# Patient Record
Sex: MALE | Race: WHITE | NOT HISPANIC OR LATINO | Employment: FULL TIME | URBAN - METROPOLITAN AREA
[De-identification: names, ages, dates, MRNs, and addresses within clinical notes are randomized per-mention and may not be internally consistent; named-entity substitution may affect disease eponyms.]

---

## 2023-11-12 PROBLEM — R74.8 ELEVATED LIVER ENZYMES: Status: ACTIVE | Noted: 2023-11-12

## 2023-11-12 PROBLEM — I21.4 NON-ST ELEVATION MYOCARDIAL INFARCTION (NSTEMI) IN RECOVERY PHASE (MULTI): Status: ACTIVE | Noted: 2023-11-12

## 2023-11-12 PROBLEM — G47.33 OSA (OBSTRUCTIVE SLEEP APNEA): Status: ACTIVE | Noted: 2023-11-12

## 2023-11-12 PROBLEM — E66.811 CLASS 1 OBESITY WITH BODY MASS INDEX (BMI) OF 32.0 TO 32.9 IN ADULT: Status: ACTIVE | Noted: 2023-11-12

## 2023-11-12 PROBLEM — R03.0 ELEVATED BLOOD PRESSURE READING: Status: ACTIVE | Noted: 2023-11-12

## 2023-11-12 PROBLEM — L23.7 CONTACT DERMATITIS DUE TO POISON IVY: Status: ACTIVE | Noted: 2023-11-12

## 2023-11-12 PROBLEM — G47.26 SHIFT WORK SLEEP DISORDER: Status: ACTIVE | Noted: 2023-11-12

## 2023-11-12 PROBLEM — R06.02 SOB (SHORTNESS OF BREATH) ON EXERTION: Status: ACTIVE | Noted: 2023-11-12

## 2023-11-12 PROBLEM — G47.19 EXCESSIVE DAYTIME SLEEPINESS: Status: ACTIVE | Noted: 2023-11-12

## 2023-11-12 PROBLEM — K21.9 GERD (GASTROESOPHAGEAL REFLUX DISEASE): Status: ACTIVE | Noted: 2023-11-12

## 2023-11-12 PROBLEM — E66.9 CLASS 1 OBESITY WITH BODY MASS INDEX (BMI) OF 32.0 TO 32.9 IN ADULT: Status: ACTIVE | Noted: 2023-11-12

## 2023-11-12 PROBLEM — S05.02XA LEFT CORNEAL ABRASION: Status: ACTIVE | Noted: 2023-11-12

## 2023-11-12 PROBLEM — E78.00 HYPERCHOLESTEROLEMIA: Status: ACTIVE | Noted: 2023-11-12

## 2023-11-12 PROBLEM — D18.01 CAPILLARY HEMANGIOMA OF SKIN: Status: ACTIVE | Noted: 2023-11-12

## 2023-11-12 PROBLEM — R06.2 WHEEZING: Status: ACTIVE | Noted: 2023-11-12

## 2023-11-12 PROBLEM — R07.9 CHEST PAIN: Status: ACTIVE | Noted: 2023-11-12

## 2023-11-12 PROBLEM — H57.10 PAIN IN EYE: Status: ACTIVE | Noted: 2023-11-12

## 2023-11-12 RX ORDER — ALBUTEROL SULFATE 0.83 MG/ML
SOLUTION RESPIRATORY (INHALATION)
COMMUNITY
Start: 2021-09-26 | End: 2023-11-14 | Stop reason: ALTCHOICE

## 2023-11-12 RX ORDER — OMEPRAZOLE 20 MG/1
1 CAPSULE, DELAYED RELEASE ORAL DAILY
COMMUNITY
Start: 2018-11-12 | End: 2023-11-14 | Stop reason: ALTCHOICE

## 2023-11-12 RX ORDER — BUDESONIDE, GLYCOPYRROLATE, AND FORMOTEROL FUMARATE 160; 9; 4.8 UG/1; UG/1; UG/1
1 AEROSOL, METERED RESPIRATORY (INHALATION)
COMMUNITY
Start: 2021-10-08 | End: 2023-11-14 | Stop reason: ALTCHOICE

## 2023-11-12 RX ORDER — AMOXICILLIN AND CLAVULANATE POTASSIUM 875; 125 MG/1; MG/1
TABLET, FILM COATED ORAL EVERY 12 HOURS
COMMUNITY
Start: 2022-10-20 | End: 2023-11-14 | Stop reason: ALTCHOICE

## 2023-11-12 RX ORDER — ALBUTEROL SULFATE 90 UG/1
1-2 AEROSOL, METERED RESPIRATORY (INHALATION) AS NEEDED
COMMUNITY
Start: 2021-09-22 | End: 2023-11-14 | Stop reason: ALTCHOICE

## 2023-11-12 RX ORDER — BENZONATATE 200 MG/1
200 CAPSULE ORAL 3 TIMES DAILY PRN
COMMUNITY
Start: 2022-10-20 | End: 2023-11-14 | Stop reason: ALTCHOICE

## 2023-11-14 ENCOUNTER — APPOINTMENT (OUTPATIENT)
Dept: CARDIOLOGY | Facility: HOSPITAL | Age: 38
End: 2023-11-14
Payer: COMMERCIAL

## 2023-11-14 ENCOUNTER — OFFICE VISIT (OUTPATIENT)
Dept: CARDIOLOGY | Facility: HOSPITAL | Age: 38
End: 2023-11-14
Payer: COMMERCIAL

## 2023-11-14 VITALS
BODY MASS INDEX: 31.81 KG/M2 | DIASTOLIC BLOOD PRESSURE: 93 MMHG | SYSTOLIC BLOOD PRESSURE: 141 MMHG | WEIGHT: 197.09 LBS | OXYGEN SATURATION: 97 % | HEART RATE: 113 BPM

## 2023-11-14 DIAGNOSIS — R03.0 ELEVATED BLOOD PRESSURE READING: Primary | ICD-10-CM

## 2023-11-14 DIAGNOSIS — G47.33 OSA (OBSTRUCTIVE SLEEP APNEA): ICD-10-CM

## 2023-11-14 DIAGNOSIS — I20.81 ANGINA PECTORIS WITH CORONARY MICROVASCULAR DYSFUNCTION (CMS-HCC): ICD-10-CM

## 2023-11-14 PROBLEM — I10 ESSENTIAL HYPERTENSION: Status: ACTIVE | Noted: 2023-11-14

## 2023-11-14 PROCEDURE — 3080F DIAST BP >= 90 MM HG: CPT | Performed by: STUDENT IN AN ORGANIZED HEALTH CARE EDUCATION/TRAINING PROGRAM

## 2023-11-14 PROCEDURE — 93010 ELECTROCARDIOGRAM REPORT: CPT | Performed by: STUDENT IN AN ORGANIZED HEALTH CARE EDUCATION/TRAINING PROGRAM

## 2023-11-14 PROCEDURE — 99204 OFFICE O/P NEW MOD 45 MIN: CPT | Performed by: STUDENT IN AN ORGANIZED HEALTH CARE EDUCATION/TRAINING PROGRAM

## 2023-11-14 PROCEDURE — 93005 ELECTROCARDIOGRAM TRACING: CPT | Performed by: STUDENT IN AN ORGANIZED HEALTH CARE EDUCATION/TRAINING PROGRAM

## 2023-11-14 PROCEDURE — 99214 OFFICE O/P EST MOD 30 MIN: CPT | Performed by: STUDENT IN AN ORGANIZED HEALTH CARE EDUCATION/TRAINING PROGRAM

## 2023-11-14 PROCEDURE — 3077F SYST BP >= 140 MM HG: CPT | Performed by: STUDENT IN AN ORGANIZED HEALTH CARE EDUCATION/TRAINING PROGRAM

## 2023-11-14 RX ORDER — AMLODIPINE BESYLATE 5 MG/1
5 TABLET ORAL DAILY
Qty: 30 TABLET | Refills: 11 | Status: SHIPPED | OUTPATIENT
Start: 2023-11-14 | End: 2024-11-13

## 2023-11-14 RX ORDER — NITROGLYCERIN 0.4 MG/1
0.4 TABLET SUBLINGUAL EVERY 5 MIN PRN
Qty: 100 TABLET | Refills: 11 | Status: SHIPPED | OUTPATIENT
Start: 2023-11-14 | End: 2023-12-07 | Stop reason: WASHOUT

## 2023-11-14 NOTE — PATIENT INSTRUCTIONS
It was nice meeting you today. Will restart amlodipine. Will get basic blood work.   Call after 2 weeks with blood pressure numbers, if blood pressure still >130/80 will add hydrochlorothiazide   Will get an echo and stress test   Quit chewing tobacco     We discussed the importance of complete smoking cessations and pharmacological options to do so.       We discussed the value of home BP monitoring. Patient agrees to bring a log of readings to follow up appointments.    We discussed the value of a Mediterranean / DASH style diet with reduction of processed carbohydrates and added salt.    Home BP monitoring instructions:::: Goal < 130 / 80   Remain still, Avoid smoking, caffeinated beverages, or exercise within 30 min before BP measurements.  Ensure 5 min of quiet rest before BP measurements.  Sit correctly with back straight and supported (on a straight-backed dining chair, for example, rather than a sofa).  Sit with feet flat on the floor and legs uncrossed.  Keep arm supported on a flat surface (such as a table), with the upper arm at heart level.  Bottom of the cuff should be placed directly above the bend of the elbow  Take a reading in the AM before breakfast 2-3 times / week   Record all readings accurately:  A written log should be brought to all appointments   Monitors with built-in memory should be brought to all clinic appointments and calibrated to our machines

## 2023-11-14 NOTE — PROGRESS NOTES
Primary Care Physician: Isiah Peters MD   Date of Visit: 11/14/2023  2:20 PM EST  Type of Visit: new      Chief Complaint:  No chief complaint on file.       HPI  Be Ortega 38 y.o. male male with history of hypertension, NELY with CPAP and GERD, had  a LHC in 6/2020 at [facility] for concerns of ACS: No significant obstructive coronary disease, note was made of slow flow in the LAD. He followed with Dr. Cabrera and was started on amlodipine with significant improvement. Coronary calcium score 0, echocardiogram was unremarkable.   He is here today for high BP    He has NELY but not on CPAP as its not working  BP 3 times a day in 140s range  He has been off amlodipine for 2 years    No le edema  No sob with exertion  Not seen by PCP this year  Had an episode of chest pain last week. Lt sided tightness and referred to lt arm he did not measure his BP. Lasted for 20-30 mins    Sometimes he gets flushing sensation   No palpitations or dizziness or syncope.    No alcohol  Quit smoking years ago  Chewing tobacco   sometimes he has troubles with sleeping      Review of Systems   Review of Systems   12 points review of systems are negative expect for the above    Social History:  Social History     Socioeconomic History    Marital status:      Spouse name: Not on file    Number of children: Not on file    Years of education: Not on file    Highest education level: Not on file   Occupational History    Not on file   Tobacco Use    Smoking status: Not on file    Smokeless tobacco: Not on file   Substance and Sexual Activity    Alcohol use: Not on file    Drug use: Not on file    Sexual activity: Not on file   Other Topics Concern    Not on file   Social History Narrative    Not on file     Social Determinants of Health     Financial Resource Strain: Not on file   Food Insecurity: Not on file   Transportation Needs: Not on file   Physical Activity: Not on file   Stress: Not on file   Social  "Connections: Not on file   Intimate Partner Violence: Not on file   Housing Stability: Not on file        Past Medical History:  Past Medical History:   Diagnosis Date    Cutaneous abscess of buttock 01/27/2016    Abscess of buttock    Other chest pain 06/09/2020    Atypical chest pain    Other muscle spasm 09/16/2015    Trapezius muscle spasm    Other specified diseases of anus and rectum 01/26/2016    Rectal pain       Past Surgical History:  No past surgical history on file.    Family History:  Family History   Problem Relation Name Age of Onset    Hypertension Father      Sleep apnea Father      Coronary artery disease Brother      Breast cancer Maternal Grandmother      Other (mesothelioma) Maternal Grandfather      Pancreatic cancer Paternal Grandfather          Objective:       6/24/2020     9:49 AM 6/24/2020    10:22 AM 7/28/2020    10:52 AM 6/15/2021     9:25 AM 9/22/2021     2:48 PM 10/8/2021    11:33 AM 10/20/2022     1:07 PM   Vitals   Systolic 141 122 120 115  133 125   Diastolic 97 86 76 79  87 80   Heart Rate 87  79 78  74 105   Temp    36.3 °C (97.3 °F) 36.4 °C (97.5 °F) 36.6 °C (97.8 °F) 37.7 °C (99.8 °F)   Resp    16  15 16   Height (in) 1.676 m (5' 6\")  1.676 m (5' 6\") 1.676 m (5' 6\") 1.676 m (5' 6\")  1.676 m (5' 6\")   Weight (lb) 190.48  184 191.2 190 201.19 188   BMI 30.74 kg/m2  29.7 kg/m2 30.86 kg/m2 30.67 kg/m2 32.47 kg/m2 30.34 kg/m2   BSA (m2) 2.01 m2  1.97 m2 2.01 m2 2 m2 2.06 m2 1.99 m2      Constitutional:       Appearance: Healthy appearance. Not in distress.   Neck:      Vascular: No JVR. JVD normal.   Pulmonary:      Effort: Pulmonary effort is normal.      Breath sounds: Normal breath sounds. No wheezing. No rhonchi. No rales.   Chest:      Chest wall: Not tender to palpatation.   Cardiovascular:      PMI at left midclavicular line. Normal rate. Regular rhythm. Normal S1. Normal S2.       Murmurs: There is no murmur.      No gallop.  No click. No rub.   Pulses:     Intact distal " pulses.   Edema:     Peripheral edema absent.   Abdominal:      General: Bowel sounds are normal.      Palpations: Abdomen is soft.      Tenderness: There is no abdominal tenderness.   Musculoskeletal: Normal range of motion.         General: No tenderness.   Skin:     General: Skin is warm and dry.   Neurological:      General: No focal deficit present.      Mental Status: Alert and oriented to person, place and time.     Allergies:  No Known Allergies    Medications:  Current Outpatient Medications   Medication Instructions    albuterol 2.5 mg /3 mL (0.083 %) nebulizer solution USE 1 UNIT DOSE EVERY 4-6 HOURS AS NEEDED FOR WHEEZING .    albuterol 90 mcg/actuation inhaler 1-2 puffs, inhalation, As needed, EVERY 4 TO 6 HOURS    amoxicillin-pot clavulanate (Augmentin) 875-125 mg tablet oral, Every 12 hours, Until gone    benzonatate (TESSALON) 200 mg, oral, 3 times daily PRN    budesonide-glycopyr-formoterol (Breztri Aerosphere) 160-9-4.8 mcg/actuation HFA aerosol inhaler 1 puff, inhalation, 2 times daily RT, then taper as tolerated    IBUPROFEN ORAL Ibuprofen    naproxen sodium (ALEVE ORAL) Aleve    omeprazole (PriLOSEC) 20 mg DR capsule 1 capsule, oral, Daily    tramadol HCl (TRAMADOL ORAL) traMADol HCl        Labs and Imaging:     Lab Results   Component Value Date    WBC 5.0 06/15/2021    HGB 15.1 06/15/2021    HCT 44.8 06/15/2021     06/15/2021    CHOL 159 06/15/2021    TRIG 82 06/15/2021    HDL 45.8 06/15/2021    ALT 80 (H) 06/15/2021    AST 40 (H) 06/15/2021     06/15/2021    K 3.9 06/15/2021     06/15/2021    CREATININE 0.83 06/15/2021    BUN 16 06/15/2021    CO2 24 06/15/2021    TSH 0.67 11/12/2018    INR 1.1 06/22/2020         Echocardiogram:   Echocardiogram     [facility], [address] [place] [address], [place] [id number]  Tel [phone] and Fax [phone]    TRANSTHORACIC ECHOCARDIOGRAM REPORT      Patient Name:     MILAGROS Santiago Physician:   [id number] Foreign  "Rick SWIFT MD  Study Date:       7/28/2020           Referring Physician: Foreign Cabrera MD  MRN/PID:          [id number]            PCP:  Accession/Order#: [id number]        Department Location: [facility]  YOB: 1985           Fellow:  Gender:           M                   Nurse:  Admit Date:                           Sonographer:         Gloria Guerrero \"RDCS,  RCS\"  Admission Status: Outpatient          Additional Staff:  Height:           167.64 cm           CC Report to:  Weight:           86.18 kg            Study Type:          Echocardiogram  BSA:              1.96 m2  Blood Pressure: 122 /86 mmHg    Diagnosis/ICD: R07.89-Other chest pain  Indication:    chest pain  Procedure/CPT: Echo Complete w Full [id number]    Patient History:  Pertinent History: Chest Pain, HTN and Family HX of CAD.    Study Detail: The following Echo studies were performed: 2D, M-Mode, Doppler and  color flow.      PHYSICIAN INTERPRETATION:  Left Ventricle: The left ventricular systolic function is normal, with an estimated ejection fraction of 55-60%. There are no regional wall motion abnormalities. The left ventricular cavity size is normal. Spectral Doppler shows a normal pattern of left ventricular diastolic filling.  Left Atrium: The left atrium is normal in size.  Right Ventricle: The right ventricle is normal in size. There is normal right ventricular global systolic function.  Right Atrium: The right atrium is normal in size.  Aortic Valve: The aortic valve is trileaflet. There is no evidence of aortic valve stenosis.  There is no evidence of aortic valve regurgitation. The peak instantaneous gradient of the aortic valve is 4.5 mmHg. The mean gradient of the aortic valve is 3.0 mmHg.  Mitral Valve: The mitral valve is normal in structure. There is trace mitral valve regurgitation.  Tricuspid Valve: The tricuspid valve is structurally normal. There is trace " tricuspid regurgitation.  Pulmonic Valve: The pulmonic valve is structurally normal. There is trace pulmonic valve regurgitation.  Pericardium: There is no pericardial effusion noted.  Aorta: The aortic root is normal.  Pulmonary Artery: The tricuspid regurgitant velocity is 2.21 m/s, and with an estimated right atrial pressure of 10 mmHg, the estimated pulmonary artery pressure is normal with the RVSP at 29.5 mmHg.  Systemic Veins: The inferior vena cava was not well visualized.  In comparison to the previous echocardiogram(s): There are no prior studies on this patient for comparison purposes.      CONCLUSIONS:  1. The left ventricular systolic function is normal with a 55-60% estimated ejection fraction.  2. There is trace mitral, tricuspid and pulmonic regurgitation.    QUANTITATIVE DATA SUMMARY:  2D MEASUREMENTS:  Normal Ranges:  Ao Root d:     3.00 cm   (2.0-3.7cm)  LAs:           3.50 cm   (2.7-4.0cm)  IVSd:          0.90 cm   (0.6-1.1cm)  LVPWd:         0.90 cm   (0.6-1.1cm)  LVIDd:         5.40 cm   (3.9-5.9cm)  LVIDs:         3.60 cm  LV Mass Index: 92.0 g/m2  LV % FS        33.3 %    LA VOLUME:  Normal Ranges:  LA Area A4C:     15.5 cm2  LA Area A2C:     12.8 cm2  LA Volume Index: 18.0 ml/m2  LA Vol A4C:      42.0 ml  LA Vol A2C:      31.0 ml  LA Vol BP:       37.0 ml    LV SYSTOLIC FUNCTION BY 2D PLANIMETRY (MOD):  Normal Ranges:  EF-A4C View: 56.5 % (>55%)  EF-A2C View: 74.5 %  EF-Biplane:  64.6 %    LV DIASTOLIC FUNCTION:  Normal Ranges:  MV Peak E:        0.59 m/s    (0.7-1.2 m/s)  MV Peak A:        0.57 m/s    (0.42-0.7 m/s)  E/A Ratio:        1.03        (1.0-2.2)  MV lateral e'     0.08 m/s  MV medial e'      0.08 m/s  MV A Dur:         114.00 msec  E/e' Ratio:       7.40        (<8.0)  PulmV Sys Dre:    49.40 cm/s  PulmV Ugarte Dre:   35.50 cm/s  PulmV S/D Dre:    1.40  PulmV A Revs Dre: 73.10 cm/s  PulmV A Revs Dur: 98.00 msec    MITRAL VALVE:  Normal Ranges:  MV DT: 153 msec  (150-240msec)    AORTIC VALVE:  Normal Ranges:  AoV Vmax:      1.06 m/s (<1.7m/s)  AoV Peak P.5 mmHg (<20mmHg)  AoV Mean PG:   3.0 mmHg (1.7-11.5mmHg)  AoV VTI:       22.70 cm (18-25cm)  LVOT Diameter: 2.00 cm  (1.8-2.4cm)    RIGHT VENTRICLE:  RV 1   3.8 cm  RV 2   3.5 cm  RV 3   4.9 cm  TAPSE: 20.0 mm    TRICUSPID VALVE/RVSP:  Normal Ranges:  Peak TR Velocity: 2.21 m/s  RV Syst Pressure: 29.5 mmHg (< 30mmHg)    PULMONIC VALVE:  Normal Ranges:  PV Max Dre: 0.9 m/s  (0.6-0.9m/s)  PV Max PG:  3.1 mmHg    Pulmonary Veins:  PulmV A Revs Dur: 98.00 msec  PulmV A Revs Dre: 73.10 cm/s  PulmV Ugarte Dre:   35.50 cm/s  PulmV S/D Dre:    1.40  PulmV Sys Dre:    49.40 cm/s      [id number] Foreign Cabrera MD  Electronically signed on 2020 at 9:13:18 AM         Final     Stress Testing: No results found for this or any previous visit from the past 1825 days.    Cardiac Catheterization:   ADULT CATH     Narrative  Ordered by an unspecified provider.    Cardiac Scoring:   CT HEART CALCIUM SCORING WO IV CONTRAST 2020    Narrative  MRN: [id number]  Patient Name: MILAGROS SWIFT    STUDY:  CT CARDIAC SCORING;  2020 2:08 pm    INDICATION:  hypercholesterolemia..    COMPARISON:  None.    ACCESSION NUMBER(S):  [id number]    ORDERING CLINICIAN:  NICHOLAS WILSON    TECHNIQUE:  Using prospective ECG gating, CT scan of the coronary arteries was  performed without intravenous contrast. Coronary calcium scoring  was  performed according to the method of Agatston.    FINDINGS:  The score and distribution of calcium in the coronary arteries is as  follows:    LM: 0.  LAD: 0.  LCx: 0.  RCA: 0.    Total: 0.    The visualized segments of the lungs demonstrate mild atelectasis.    The visualized mid/lower ascending thoracic aorta measures 2.6 cm in  diameter.    The heart is normal in size. No pericardial effusion is present.    No gross evidence of mediastinal or hilar lymphadenopathy is  identified.    There is hepatic  "steatosis.    Impression  1. Coronary artery calcium score of 0*.    *Coronary artery calcium scoring may be helpful in predicting the  risk for future coronary heart disease events.  According to the  American College of Cardiology Foundation Clinical Expert Consensus  Task Force, such testing provides important prognostic information in  patients with more than one coronary heart disease risk factor. The  coronary artery calcium score correlates with the annual risk of a  non-fatal myocardial infarction or coronary heart disease death.    Coronary artery score            Annual Risk    0-99                             0.4%  100-399                        1.3%  >400                            2.4%    These three \"breakpoints\" correspond to lower, intermediate and high  risk states for future coronary events.  Such information should be  used, along with appropriate clinical judgment, to make decisions  regarding the intensity of risk factor management strategies to treat  blood lipids and to modify other non-lipid coronary risk factors.    Reference: [place] P et al. Circulation.  2007; 115:402-426    AAA : No results found for this or any previous visit from the past 1825 days.    OTHER: No results found for this or any previous visit from the past 1825 days.      The ASCVD Risk score (Grazyna DK, et al., 2019) failed to calculate for the following reasons:    The 2019 ASCVD risk score is only valid for ages 40 to 79    The patient has a prior MI or stroke diagnosis     Assessment/Plan:   No diagnosis found.   Luisjasonleigh Ortega 38 y.o. male male with history of hypertension, NELY with CPAP and GERD, had  a LHC in 6/2020 at [facility] for concerns of ACS: No significant obstructive coronary disease, note was made of slow flow in the LAD. He followed with Dr. Cabrera and was started on amlodipine with significant improvement. Coronary calcium score 0, echocardiogram was unremarkable.     EKG today with new TWI " in ant-lateral leads compared to 2020  He had typical anginal pain last week.     Plan  Restart amlodipine  Will get an echo   Will get a treadmill nuc   Will give SLN script   Consider adding hydrochlorothiazide 25 mg qd if BP still not controlled   Quit chewing tobacco   Will get full lab panel     Time Spent: I spent  minutes reviewing medical testing, obtaining medical history and counselling and educating on diagnosis and documenting clinical encounter.         ____________________________________________________________  Max Reed MD   of Medicine  Division of Cardiovascular Medicine   [facility]  [facility]

## 2023-11-15 LAB
ATRIAL RATE: 107 BPM
P AXIS: 25 DEGREES
P OFFSET: 203 MS
P ONSET: 148 MS
PR INTERVAL: 140 MS
Q ONSET: 218 MS
QRS COUNT: 18 BEATS
QRS DURATION: 84 MS
QT INTERVAL: 306 MS
QTC CALCULATION(BAZETT): 408 MS
QTC FREDERICIA: 371 MS
R AXIS: 86 DEGREES
T AXIS: -39 DEGREES
T OFFSET: 371 MS
VENTRICULAR RATE: 107 BPM

## 2023-11-23 ENCOUNTER — APPOINTMENT (OUTPATIENT)
Dept: RADIOLOGY | Facility: HOSPITAL | Age: 38
End: 2023-11-23
Payer: COMMERCIAL

## 2023-11-23 ENCOUNTER — HOSPITAL ENCOUNTER (EMERGENCY)
Facility: HOSPITAL | Age: 38
Discharge: HOME | End: 2023-11-23
Attending: STUDENT IN AN ORGANIZED HEALTH CARE EDUCATION/TRAINING PROGRAM
Payer: COMMERCIAL

## 2023-11-23 VITALS
HEIGHT: 66 IN | RESPIRATION RATE: 18 BRPM | SYSTOLIC BLOOD PRESSURE: 127 MMHG | OXYGEN SATURATION: 96 % | HEART RATE: 74 BPM | WEIGHT: 199.3 LBS | DIASTOLIC BLOOD PRESSURE: 89 MMHG | BODY MASS INDEX: 32.03 KG/M2 | TEMPERATURE: 98.1 F

## 2023-11-23 DIAGNOSIS — R07.9 CHEST PAIN, UNSPECIFIED TYPE: Primary | ICD-10-CM

## 2023-11-23 LAB
ALBUMIN SERPL-MCNC: 4.7 G/DL (ref 3.5–5)
ALP BLD-CCNC: 60 U/L (ref 35–125)
ALT SERPL-CCNC: 62 U/L (ref 5–40)
ANION GAP SERPL CALC-SCNC: 10 MMOL/L
AST SERPL-CCNC: 32 U/L (ref 5–40)
BILIRUB SERPL-MCNC: 0.4 MG/DL (ref 0.1–1.2)
BUN SERPL-MCNC: 20 MG/DL (ref 8–25)
CALCIUM SERPL-MCNC: 9.6 MG/DL (ref 8.5–10.4)
CHLORIDE SERPL-SCNC: 102 MMOL/L (ref 97–107)
CO2 SERPL-SCNC: 25 MMOL/L (ref 24–31)
CREAT SERPL-MCNC: 1.3 MG/DL (ref 0.4–1.6)
ERYTHROCYTE [DISTWIDTH] IN BLOOD BY AUTOMATED COUNT: 12.5 % (ref 11.5–14.5)
GFR SERPL CREATININE-BSD FRML MDRD: 72 ML/MIN/1.73M*2
GLUCOSE SERPL-MCNC: 98 MG/DL (ref 65–99)
HCT VFR BLD AUTO: 43.1 % (ref 41–52)
HGB BLD-MCNC: 15.1 G/DL (ref 13.5–17.5)
MCH RBC QN AUTO: 28.8 PG (ref 26–34)
MCHC RBC AUTO-ENTMCNC: 35 G/DL (ref 32–36)
MCV RBC AUTO: 82 FL (ref 80–100)
NRBC BLD-RTO: 0 /100 WBCS (ref 0–0)
PLATELET # BLD AUTO: 228 X10*3/UL (ref 150–450)
POTASSIUM SERPL-SCNC: 3.9 MMOL/L (ref 3.4–5.1)
PROT SERPL-MCNC: 7.1 G/DL (ref 5.9–7.9)
RBC # BLD AUTO: 5.25 X10*6/UL (ref 4.5–5.9)
SODIUM SERPL-SCNC: 137 MMOL/L (ref 133–145)
TROPONIN T SERPL-MCNC: <6 NG/L
TROPONIN T SERPL-MCNC: <6 NG/L
WBC # BLD AUTO: 5.9 X10*3/UL (ref 4.4–11.3)

## 2023-11-23 PROCEDURE — 96374 THER/PROPH/DIAG INJ IV PUSH: CPT

## 2023-11-23 PROCEDURE — 85027 COMPLETE CBC AUTOMATED: CPT | Performed by: NURSE PRACTITIONER

## 2023-11-23 PROCEDURE — 36415 COLL VENOUS BLD VENIPUNCTURE: CPT | Performed by: NURSE PRACTITIONER

## 2023-11-23 PROCEDURE — 84484 ASSAY OF TROPONIN QUANT: CPT | Performed by: NURSE PRACTITIONER

## 2023-11-23 PROCEDURE — 80053 COMPREHEN METABOLIC PANEL: CPT | Performed by: NURSE PRACTITIONER

## 2023-11-23 PROCEDURE — 2500000004 HC RX 250 GENERAL PHARMACY W/ HCPCS (ALT 636 FOR OP/ED): Performed by: NURSE PRACTITIONER

## 2023-11-23 PROCEDURE — 71045 X-RAY EXAM CHEST 1 VIEW: CPT | Mod: FY

## 2023-11-23 PROCEDURE — 99284 EMERGENCY DEPT VISIT MOD MDM: CPT | Mod: 25

## 2023-11-23 PROCEDURE — 99285 EMERGENCY DEPT VISIT HI MDM: CPT | Mod: 25 | Performed by: STUDENT IN AN ORGANIZED HEALTH CARE EDUCATION/TRAINING PROGRAM

## 2023-11-23 RX ORDER — KETOROLAC TROMETHAMINE 30 MG/ML
30 INJECTION, SOLUTION INTRAMUSCULAR; INTRAVENOUS ONCE
Status: COMPLETED | OUTPATIENT
Start: 2023-11-23 | End: 2023-11-23

## 2023-11-23 RX ADMIN — KETOROLAC TROMETHAMINE 30 MG: 30 INJECTION, SOLUTION INTRAMUSCULAR at 19:59

## 2023-11-23 ASSESSMENT — HEART SCORE
RISK FACTORS: 1-2 RISK FACTORS
TROPONIN: LESS THAN OR EQUAL TO NORMAL LIMIT
AGE: <45
HEART SCORE: 3
HISTORY: MODERATELY SUSPICIOUS
ECG: NON-SPECIFIC REPOLARIZATION DISTURBANCE

## 2023-11-23 ASSESSMENT — PAIN DESCRIPTION - DESCRIPTORS
DESCRIPTORS: TIGHTNESS
DESCRIPTORS: TIGHTNESS;PRESSURE

## 2023-11-23 ASSESSMENT — PAIN SCALES - GENERAL
PAINLEVEL_OUTOF10: 0 - NO PAIN
PAINLEVEL_OUTOF10: 4
PAINLEVEL_OUTOF10: 3

## 2023-11-23 ASSESSMENT — PAIN - FUNCTIONAL ASSESSMENT
PAIN_FUNCTIONAL_ASSESSMENT: 0-10
PAIN_FUNCTIONAL_ASSESSMENT: 0-10

## 2023-11-23 ASSESSMENT — COLUMBIA-SUICIDE SEVERITY RATING SCALE - C-SSRS
2. HAVE YOU ACTUALLY HAD ANY THOUGHTS OF KILLING YOURSELF?: NO
1. IN THE PAST MONTH, HAVE YOU WISHED YOU WERE DEAD OR WISHED YOU COULD GO TO SLEEP AND NOT WAKE UP?: NO
6. HAVE YOU EVER DONE ANYTHING, STARTED TO DO ANYTHING, OR PREPARED TO DO ANYTHING TO END YOUR LIFE?: NO

## 2023-11-23 ASSESSMENT — PAIN DESCRIPTION - DIRECTION: RADIATING_TOWARDS: LEFT ARM

## 2023-11-23 ASSESSMENT — PAIN DESCRIPTION - LOCATION: LOCATION: CHEST

## 2023-11-24 ENCOUNTER — HOSPITAL ENCOUNTER (OUTPATIENT)
Dept: CARDIOLOGY | Facility: HOSPITAL | Age: 38
Discharge: HOME | End: 2023-11-24
Payer: COMMERCIAL

## 2023-11-24 LAB
ATRIAL RATE: 84 BPM
P AXIS: 32 DEGREES
P OFFSET: 198 MS
P ONSET: 142 MS
PR INTERVAL: 156 MS
Q ONSET: 220 MS
QRS COUNT: 14 BEATS
QRS DURATION: 84 MS
QT INTERVAL: 348 MS
QTC CALCULATION(BAZETT): 411 MS
QTC FREDERICIA: 389 MS
R AXIS: 37 DEGREES
T AXIS: 21 DEGREES
T OFFSET: 394 MS
VENTRICULAR RATE: 84 BPM

## 2023-11-24 PROCEDURE — 93005 ELECTROCARDIOGRAM TRACING: CPT

## 2023-11-24 NOTE — ED PROVIDER NOTES
HPI   Chief Complaint   Patient presents with    Chest Pain     Patient reports chest tightness for about an hour and a half that is radiating to the left arm. Patient has cardiac hx, took 3 nitroglycerin pta with no relief. Denies nausea and sob.       HPI  See my MDM                  No data recorded                Patient History   Past Medical History:   Diagnosis Date    Cutaneous abscess of buttock 01/27/2016    Abscess of buttock    Other chest pain 06/09/2020    Atypical chest pain    Other muscle spasm 09/16/2015    Trapezius muscle spasm    Other specified diseases of anus and rectum 01/26/2016    Rectal pain     No past surgical history on file.  Family History   Problem Relation Name Age of Onset    Hypertension Father      Sleep apnea Father      Coronary artery disease Brother      Breast cancer Maternal Grandmother      Other (mesothelioma) Maternal Grandfather      Pancreatic cancer Paternal Grandfather       Social History     Tobacco Use    Smoking status: Not on file    Smokeless tobacco: Not on file   Substance Use Topics    Alcohol use: Not on file    Drug use: Not on file       Physical Exam   ED Triage Vitals [11/23/23 1857]   Temp Heart Rate Resp BP   36.7 °C (98 °F) 88 18 137/74      SpO2 Temp Source Heart Rate Source Patient Position   98 % Oral Monitor Sitting      BP Location FiO2 (%)     Right arm --       Physical Exam  CONSTITUTIONAL: Vital signs reviewed as charted, well-developed and in no distress  Eyes: Extraocular muscles are intact. Pupils equal round and reactive to light. Conjunctiva are pink.    ENT: Mucous membranes are moist. Tongue in the midline. Pharynx was without erythema or exudates, uvula midline  LUNGS: Breath sounds equal and clear to auscultation. Good air exchange, no wheezes rales or retractions, pulse oximetry is charted.  HEART: Regular rate and rhythm without murmur thrill or rub, strong tones, auscultation is normal.  ABDOMEN: Soft and nontender without  guarding rebound rigidity or mass. Bowel sounds are present and normal in all quadrants. There is no palpable masses or aneurysms identified. No hepatosplenomegaly, normal abdominal exam.  Neuro: The patient is awake, alert and oriented ×3. Moving all 4 extremities and answering questions appropriately.   MUSCULOSKELETAL: The calves are nontender to palpation. Full gross active range of motion.   PSYCH: Awake alert oriented, normal mood and affect.  Skin:  Dry, normal color, warm to the touch, no rash present.      ED Course & MDM   ED Course as of 11/24/23 1001   Thu Nov 23, 2023 1921 EKG Time: 1911  EKG Interpretation time: 1920  EKG Interpretation: Normal sinus rhythm, nonspecific T wave changes, QTc 411, no evidence of STEMI    EKG was interpreted by myself independently [PARMINDER]   2000 Care transferred to dr. Short [SERAFIN]      ED Course User Index  [JL] Josafat Short DO  [RJ] Juan Bella, APRN-CNP         Diagnoses as of 11/24/23 1001   Chest pain, unspecified type       Medical Decision Making  History obtained from: patient    Vital signs, nursing notes, current medications, past medical history, Surgical history, allergies, social history, family History were reviewed.         HPI:  Patient is a 38-year-old male presenting emergency room today complaining of chest tightness ongoing for about 30 minutes radiates to his left arm.  Took 3 nitro prior to arrival without relief of symptoms.  Had a cardiac work-up done about 3 years ago including normal catheterization, normal transesophageal echo.  Recently was evaluated by his cardiologist has been having some intermittent chest pain about 10 days ago had an EKG with some T wave inversions.  Was put back on his blood pressure medicine and given sublingual nitro.  He also had a calcium scoring test done about 3 years ago with a score of 0.  He denies any fever chills or night sweats.  States he has had some sinus congestion.  Denies nausea or vomiting.  No  alleviating or chest pain factors.  No recent travel or surgeries.  He does work as a .  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  Former smoker, current chewing tobacco user      10 point ROS was reviewed and negative except Noted above in HPI.  DDX: as listed above    Chest x-ray interpreted by the radiologist showed: No acute cardiopulmonary process  Medications administered during this visit (name and route): IV ketorolac  EKG interpretted by my attending physician    ESTELLA Summary/considerations:  I estimate there is a low risk for pericardial tamponade, pneumothorax, pulmonary embolism, acute coronary syndrome, or thoracic aortic dissection, thus I considered the discharge disposition reasonable. We have discussed the diagnosis and risks, and we agree with discharging home to follow up with there cardiologist. We also discussed returning to the emergency department immediately if new or worsening symptoms occur. We have discussed the symptoms which are most concerning such as bloody sputum, fever, worsening pain or shortness of breath, or vomiting necessitates immediate return.    Discussed heart score with patient, Heart score 3 or less and risk of MACE of 0.9-1.7%. in the next six weeks, patient understands although low risk they're still at risk and will discuss this with their PCP and obtain further outpatient cardiac testing in the next 7 days. offered observation patient or any family members felt uncomfortable do not feel necessary at this time.Offered observation for further monitoring evaluation and treatment if patient or any others present felt uncomfortable with plan, do not feel necessary at this time.      Patient with 2 normal troponins, normal chest x-ray, normal electrolytes, normal blood counts.  Was feeling better after IV ketorolac here in the ED.  Heart score of 3.  Will be discharged home to follow-up with cardiologist 1 to 2 days for reevaluation.  Was discharged home in stable condition.  Does  have orders for echocardiogram and stress test currently.        I saw this patient in conjunction with Dr. Short, please see his supervision note.    All of the patient's questions were answered to the best of my ability.  Patient states understanding that they have been screened for an emergency today and we have not found any etiology of symptoms that requires emergent treatment or admission to the hospital at this point. They understand that they have not had definitive care day and require follow-up for treatment of their condition. They also state understanding that they may have an emergent condition that may potentially have not of detected at this visit and they must return to the emergency department if they develop any worsening of symptoms or new complaints.      Critical Care: Not warranted at this time    Prescriptions provided include: none    This chart was completed using voice recognition transcription software. Please excuse any errors of transcription including grammatical, punctuation, syntax and spelling errors.  Please contact me with any questions regarding this chart.    Procedure  Procedures     CHARLES Salguero-CNP  11/24/23 1002

## 2023-11-24 NOTE — ED PROVIDER NOTES
Patient was seen by both myself and advanced practitioner.  I performed substantive portion of the visit including all aspects of the medical decision making.  Please refer to advanced practitioner's note further workup, evaluation.    Patient is a 38-year-old male who presents emergency department for evaluation of chest pain.  Patient states that he started having a tightness in the left side of the chest that radiates to his left arm while he was driving prior to arrival.  He tried taking 3 of his nitroglycerin prior to arrival with no improvement of pain.  He states it has significantly subsided at this time.  He states he had similar episodes several years ago and was diagnosed with angina and given prescription for nitro.  He did have a heart cath at that time however it was clear showing no evidence of blockage.  Patient states he was scheduled for stress test and echo as an outpatient by his cardiologist.    On exam patient resting comfortably in no obvious distress.  Vital signs are stable arrival.  He is awake, alert and oriented.  Lungs are clear to auscultation bilaterally and respirations are easy, nonlabored.  Regular rate and rhythm on auscultation of the heart and 2+ radial pulses bilaterally.  He has no numbness or tingling the extremities, weakness of the extremities.  EKG shows normal sinus rhythm with nonspecific ST changes but no evidence of STEMI.  Initial blood work was unremarkable including negative troponin of less than 6, normal white count, normal electrolytes.  Chest x-ray is clear showing no obvious evidence of pneumonia, pneumothorax, wide mediastinum.  He was given p.o. Toradol for pain.  Repeat troponin remained flat at 6 and patient has a heart score of 3.  I have low suspicion for major cardiac issue and patient is felt to be stable for outpatient follow-up at this time.  Return precautions discussed with patient and his significant other.     Josafat Short, DO  11/23/23 [id number]

## 2023-11-24 NOTE — DISCHARGE INSTRUCTIONS
Follow-up with your primary care physician as an outpatient as well as your cardiologist by calling the office tomorrow to schedule close follow-up appointment.  If symptoms worsen or change he can return at any time for further evaluation and treatment.

## 2023-12-01 ENCOUNTER — HOSPITAL ENCOUNTER (OUTPATIENT)
Dept: CARDIOLOGY | Facility: HOSPITAL | Age: 38
Discharge: HOME | End: 2023-12-01
Payer: COMMERCIAL

## 2023-12-01 ENCOUNTER — HOSPITAL ENCOUNTER (OUTPATIENT)
Dept: RADIOLOGY | Facility: HOSPITAL | Age: 38
Discharge: HOME | End: 2023-12-01
Payer: COMMERCIAL

## 2023-12-01 ENCOUNTER — LAB (OUTPATIENT)
Dept: LAB | Facility: LAB | Age: 38
End: 2023-12-01
Payer: COMMERCIAL

## 2023-12-01 DIAGNOSIS — R03.0 ELEVATED BLOOD PRESSURE READING: ICD-10-CM

## 2023-12-01 DIAGNOSIS — I20.89 OTHER FORMS OF ANGINA PECTORIS (CMS-HCC): ICD-10-CM

## 2023-12-01 DIAGNOSIS — R07.9 CHEST PAIN, UNSPECIFIED: ICD-10-CM

## 2023-12-01 DIAGNOSIS — I20.81 ANGINA PECTORIS WITH CORONARY MICROVASCULAR DYSFUNCTION (CMS-HCC): ICD-10-CM

## 2023-12-01 DIAGNOSIS — G47.33 OSA (OBSTRUCTIVE SLEEP APNEA): ICD-10-CM

## 2023-12-01 LAB
ALBUMIN SERPL BCP-MCNC: 5 G/DL (ref 3.4–5)
ALP SERPL-CCNC: 58 U/L (ref 33–120)
ALT SERPL W P-5'-P-CCNC: 49 U/L (ref 10–52)
ANION GAP SERPL CALC-SCNC: 14 MMOL/L (ref 10–20)
AST SERPL W P-5'-P-CCNC: 22 U/L (ref 9–39)
BILIRUB SERPL-MCNC: 0.6 MG/DL (ref 0–1.2)
BUN SERPL-MCNC: 18 MG/DL (ref 6–23)
CALCIUM SERPL-MCNC: 9.4 MG/DL (ref 8.6–10.3)
CHLORIDE SERPL-SCNC: 104 MMOL/L (ref 98–107)
CHOLEST SERPL-MCNC: 140 MG/DL (ref 0–199)
CHOLESTEROL/HDL RATIO: 3.1
CO2 SERPL-SCNC: 25 MMOL/L (ref 21–32)
CREAT SERPL-MCNC: 0.89 MG/DL (ref 0.5–1.3)
ERYTHROCYTE [DISTWIDTH] IN BLOOD BY AUTOMATED COUNT: 12.7 % (ref 11.5–14.5)
GFR SERPL CREATININE-BSD FRML MDRD: >90 ML/MIN/1.73M*2
GLUCOSE SERPL-MCNC: 93 MG/DL (ref 74–99)
HCT VFR BLD AUTO: 45.9 % (ref 41–52)
HDLC SERPL-MCNC: 45.5 MG/DL
HGB BLD-MCNC: 15.6 G/DL (ref 13.5–17.5)
LDLC SERPL CALC-MCNC: 76 MG/DL
MCH RBC QN AUTO: 29.1 PG (ref 26–34)
MCHC RBC AUTO-ENTMCNC: 34 G/DL (ref 32–36)
MCV RBC AUTO: 86 FL (ref 80–100)
NON HDL CHOLESTEROL: 95 MG/DL (ref 0–149)
NRBC BLD-RTO: 0 /100 WBCS (ref 0–0)
PLATELET # BLD AUTO: 206 X10*3/UL (ref 150–450)
POTASSIUM SERPL-SCNC: 4.7 MMOL/L (ref 3.5–5.3)
PROT SERPL-MCNC: 7.2 G/DL (ref 6.4–8.2)
RBC # BLD AUTO: 5.37 X10*6/UL (ref 4.5–5.9)
SODIUM SERPL-SCNC: 138 MMOL/L (ref 136–145)
TRIGL SERPL-MCNC: 92 MG/DL (ref 0–149)
TSH SERPL-ACNC: 1.2 MIU/L (ref 0.44–3.98)
VLDL: 18 MG/DL (ref 0–40)
WBC # BLD AUTO: 7.8 X10*3/UL (ref 4.4–11.3)

## 2023-12-01 PROCEDURE — 80053 COMPREHEN METABOLIC PANEL: CPT

## 2023-12-01 PROCEDURE — 78452 HT MUSCLE IMAGE SPECT MULT: CPT

## 2023-12-01 PROCEDURE — 80061 LIPID PANEL: CPT

## 2023-12-01 PROCEDURE — 3430000001 HC RX 343 DIAGNOSTIC RADIOPHARMACEUTICALS: Performed by: STUDENT IN AN ORGANIZED HEALTH CARE EDUCATION/TRAINING PROGRAM

## 2023-12-01 PROCEDURE — 78452 HT MUSCLE IMAGE SPECT MULT: CPT | Performed by: RADIOLOGY

## 2023-12-01 PROCEDURE — 93016 CV STRESS TEST SUPVJ ONLY: CPT | Performed by: RADIOLOGY

## 2023-12-01 PROCEDURE — 93306 TTE W/DOPPLER COMPLETE: CPT | Performed by: INTERNAL MEDICINE

## 2023-12-01 PROCEDURE — 93018 CV STRESS TEST I&R ONLY: CPT | Performed by: RADIOLOGY

## 2023-12-01 PROCEDURE — A9502 TC99M TETROFOSMIN: HCPCS | Performed by: STUDENT IN AN ORGANIZED HEALTH CARE EDUCATION/TRAINING PROGRAM

## 2023-12-01 PROCEDURE — 93016 CV STRESS TEST SUPVJ ONLY: CPT | Performed by: INTERNAL MEDICINE

## 2023-12-01 PROCEDURE — 93017 CV STRESS TEST TRACING ONLY: CPT

## 2023-12-01 PROCEDURE — 84443 ASSAY THYROID STIM HORMONE: CPT

## 2023-12-01 PROCEDURE — 93306 TTE W/DOPPLER COMPLETE: CPT

## 2023-12-01 PROCEDURE — 36415 COLL VENOUS BLD VENIPUNCTURE: CPT

## 2023-12-01 PROCEDURE — 85027 COMPLETE CBC AUTOMATED: CPT

## 2023-12-01 RX ADMIN — TETROFOSMIN 10.1 MILLICURIE: 0.23 INJECTION, POWDER, LYOPHILIZED, FOR SOLUTION INTRAVENOUS at 09:47

## 2023-12-01 RX ADMIN — TETROFOSMIN 33.1 MILLICURIE: 0.23 INJECTION, POWDER, LYOPHILIZED, FOR SOLUTION INTRAVENOUS at 11:05

## 2023-12-04 LAB
AORTIC VALVE MEAN GRADIENT: 2.6
AORTIC VALVE PEAK VELOCITY: 1.15
AV PEAK GRADIENT: 5.3
AVA (PEAK VEL): 1.88
AVA (VTI): 1.9
EJECTION FRACTION APICAL 4 CHAMBER: 62.6
EJECTION FRACTION: 61
LEFT ATRIUM VOLUME AREA LENGTH INDEX BSA: 18.8
LEFT VENTRICLE INTERNAL DIMENSION DIASTOLE: 4.52 (ref 3.5–6)
LEFT VENTRICULAR OUTFLOW TRACT DIAMETER: 1.99
MITRAL VALVE E/A RATIO: 0.78
RIGHT VENTRICLE PEAK SYSTOLIC PRESSURE: 29.4
TRICUSPID ANNULAR PLANE SYSTOLIC EXCURSION: 2.7

## 2023-12-05 ENCOUNTER — OFFICE VISIT (OUTPATIENT)
Dept: OTOLARYNGOLOGY | Facility: CLINIC | Age: 38
End: 2023-12-05
Payer: COMMERCIAL

## 2023-12-05 ENCOUNTER — PREP FOR PROCEDURE (OUTPATIENT)
Dept: OTOLARYNGOLOGY | Facility: HOSPITAL | Age: 38
End: 2023-12-05

## 2023-12-05 VITALS — BODY MASS INDEX: 30.45 KG/M2 | HEIGHT: 67 IN | WEIGHT: 194 LBS | TEMPERATURE: 97.5 F

## 2023-12-05 DIAGNOSIS — G47.33 OBSTRUCTIVE SLEEP APNEA: Primary | ICD-10-CM

## 2023-12-05 DIAGNOSIS — J34.2 DEVIATED NASAL SEPTUM: ICD-10-CM

## 2023-12-05 PROCEDURE — 99204 OFFICE O/P NEW MOD 45 MIN: CPT | Performed by: OTOLARYNGOLOGY

## 2023-12-05 PROCEDURE — 1036F TOBACCO NON-USER: CPT | Performed by: OTOLARYNGOLOGY

## 2023-12-05 PROCEDURE — 3008F BODY MASS INDEX DOCD: CPT | Performed by: OTOLARYNGOLOGY

## 2023-12-05 RX ORDER — SODIUM CHLORIDE, SODIUM LACTATE, POTASSIUM CHLORIDE, CALCIUM CHLORIDE 600; 310; 30; 20 MG/100ML; MG/100ML; MG/100ML; MG/100ML
100 INJECTION, SOLUTION INTRAVENOUS CONTINUOUS
Status: CANCELLED | OUTPATIENT
Start: 2023-12-05

## 2023-12-05 RX ORDER — OMEPRAZOLE 20 MG/1
20 TABLET, DELAYED RELEASE ORAL
COMMUNITY
End: 2023-12-07 | Stop reason: WASHOUT

## 2023-12-05 RX ORDER — OXYMETAZOLINE HCL 0.05 %
2 SPRAY, NON-AEROSOL (ML) NASAL ONCE
Status: CANCELLED | OUTPATIENT
Start: 2023-12-05 | End: 2023-12-05

## 2023-12-05 NOTE — PROGRESS NOTES
"Chief Complaint   Patient presents with    Sleep Apnea     LOV: 2012 REFERRED BY DR. HOME BAEZ CONSULT      Date of Evaluation: 12/5/2023   HPI  Be Ortega is a 38 y.o. male seen in consultation at the request of Dr. Barrientos for evaluation for william.  He is a  who has a several year history of obstructive sleep apnea.  He has been tried on CPAP BiPAP and dental appliance without continued success.  He winds up ripping it off throughout the night.  His most recent sleep study was September 2023 that showed an RDI 4% of 19.7 with minimal central apnea.  His oxygen tierney was 82%.  He does have hypertension and a family history of coronary artery disease.  He is following with cardiology and being worked up.  He has daytime tiredness and very much wants his sleep apnea under control       Past Medical History:   Diagnosis Date    Asthma     Cutaneous abscess of buttock 01/27/2016    Abscess of buttock    Hypertension     Other chest pain 06/09/2020    Atypical chest pain    Other muscle spasm 09/16/2015    Trapezius muscle spasm    Other specified diseases of anus and rectum 01/26/2016    Rectal pain    Sleep apnea       Past Surgical History:   Procedure Laterality Date    CARDIAC CATHETERIZATION            Medications:   Current Outpatient Medications   Medication Instructions    amLODIPine (NORVASC) 5 mg, oral, Daily    nitroglycerin (NITROSTAT) 0.4 mg, sublingual, Every 5 min PRN, May repeat dose every 5 minutes for up to 3 doses total.    omeprazole OTC (PRILOSEC OTC) 20 mg, oral, Daily before breakfast, Do not crush, chew, or split.        Allergies:  No Known Allergies     Physical Exam:  Last Recorded Vitals  Temperature 36.4 °C (97.5 °F), height 1.702 m (5' 7\"), weight 88 kg (194 lb).  []General appearance: Well-developed, well-nourished in no acute distress, conversant with normal voice quality    Head/face: No erythema or edema or facial tenderness, and normal facial nerve function " bilaterally    External ear: Clear external auditory canals with normal pinnae  Tube status: N/A  Middle ear: Tympanic membranes intact and mobile, middle ears normal.  Tympanic membrane perforation: N/A  Mastoid bowl: N/A  Hearing: Normal conversational awareness at normal speech thresholds    Nose visualized using: Anterior rhinoscopy  Nasal dorsum: Nontraumatic midline appearance  Septum: Deviated left  Inferior turbinates: Normal, pink, hypertrophy  Secretions: Dry    Oral cavity and oropharynx: Normal  Teeth: Good condition  Floor of mouth: without lesions  Palate: Normal hard palate, soft palate and uvula  Oropharynx: Clear, no lesions present  Buccal mucosa: Normal without masses or lesions  Lips: Normal    Nasopharynx: Inadequate mirror exam secondary to gag/anatomy    Neck:  Salivary glands: Normal bilateral parotid and submandibular glands by inspection and palpation.  Non-thyroid masses: No palpable masses or significant lymphadenopathy  Trachea: Midline  Thyroid: No thyromegaly or palpable nodules  Temporomandibular joint: Nontender  Cervical range of motion: Normal    Neurologic exam: Alert and oriented x3, appropriate affect.  Cranial nerves II-XII normal bilaterally  Extraocular movement: Extraocular movement intact, normal gaze alignment        Be was seen today for sleep apnea.  Diagnoses and all orders for this visit:  Obstructive sleep apnea (Primary)  Deviated nasal septum  BMI 30.0-30.9,adult       PLAN  We have had a long discussion regarding sleep apnea.  He is very interested in getting it under control.  We discussed the possibility of inspire.  I have recommended that we proceed with a drug-induced sleep endoscopy to evaluate his airway for the level of obstruction.  He would like to proceed with scheduling.    Jem Mueller MD

## 2023-12-05 NOTE — LETTER
December 5, 2023     Huong Bhat MD  [address]  [facility], [address] [address]    Patient: Be Ortega   YOB: 1985   Date of Visit: 12/5/2023       Dear Dr. Huong Bhat MD:    Thank you for referring Be Ortega to me for evaluation. Below are my notes for this consultation.  If you have questions, please do not hesitate to call me. I look forward to following your patient along with you.       Sincerely,     Jem Mueller MD      CC: No Recipients  ______________________________________________________________________________________    Chief Complaint   Patient presents with   • Sleep Apnea     LOV: 2012 REFERRED BY DR. BHAT INSPIRE CONSULT      Date of Evaluation: 12/5/2023   HPI  Be Ortega is a 38 y.o. male seen in consultation at the request of Dr. Bhat for evaluation for inspire.  He is a  who has a several year history of obstructive sleep apnea.  He has been tried on CPAP BiPAP and dental appliance without continued success.  He winds up ripping it off throughout the night.  His most recent sleep study was September 2023 that showed an RDI 4% of 19.7 with minimal central apnea.  His oxygen tierney was 82%.  He does have hypertension and a family history of coronary artery disease.  He is following with cardiology and being worked up.  He has daytime tiredness and very much wants his sleep apnea under control       Past Medical History:   Diagnosis Date   • Asthma    • Cutaneous abscess of buttock 01/27/2016    Abscess of buttock   • Hypertension    • Other chest pain 06/09/2020    Atypical chest pain   • Other muscle spasm 09/16/2015    Trapezius muscle spasm   • Other specified diseases of anus and rectum 01/26/2016    Rectal pain   • Sleep apnea       Past Surgical History:   Procedure Laterality Date   • CARDIAC CATHETERIZATION            Medications:   Current Outpatient Medications   Medication  "Instructions   • amLODIPine (NORVASC) 5 mg, oral, Daily   • nitroglycerin (NITROSTAT) 0.4 mg, sublingual, Every 5 min PRN, May repeat dose every 5 minutes for up to 3 doses total.   • omeprazole OTC (PRILOSEC OTC) 20 mg, oral, Daily before breakfast, Do not crush, chew, or split.        Allergies:  No Known Allergies     Physical Exam:  Last Recorded Vitals  Temperature 36.4 °C (97.5 °F), height 1.702 m (5' 7\"), weight 88 kg (194 lb).  []General appearance: Well-developed, well-nourished in no acute distress, conversant with normal voice quality    Head/face: No erythema or edema or facial tenderness, and normal facial nerve function bilaterally    External ear: Clear external auditory canals with normal pinnae  Tube status: N/A  Middle ear: Tympanic membranes intact and mobile, middle ears normal.  Tympanic membrane perforation: N/A  Mastoid bowl: N/A  Hearing: Normal conversational awareness at normal speech thresholds    Nose visualized using: Anterior rhinoscopy  Nasal dorsum: Nontraumatic midline appearance  Septum: Deviated left  Inferior turbinates: Normal, pink, hypertrophy  Secretions: Dry    Oral cavity and oropharynx: Normal  Teeth: Good condition  Floor of mouth: without lesions  Palate: Normal hard palate, soft palate and uvula  Oropharynx: Clear, no lesions present  Buccal mucosa: Normal without masses or lesions  Lips: Normal    Nasopharynx: Inadequate mirror exam secondary to gag/anatomy    Neck:  Salivary glands: Normal bilateral parotid and submandibular glands by inspection and palpation.  Non-thyroid masses: No palpable masses or significant lymphadenopathy  Trachea: Midline  Thyroid: No thyromegaly or palpable nodules  Temporomandibular joint: Nontender  Cervical range of motion: Normal    Neurologic exam: Alert and oriented x3, appropriate affect.  Cranial nerves II-XII normal bilaterally  Extraocular movement: Extraocular movement intact, normal gaze alignment        Be was seen today " for sleep apnea.  Diagnoses and all orders for this visit:  Obstructive sleep apnea (Primary)  Deviated nasal septum  BMI 30.0-30.9,adult       PLAN  We have had a long discussion regarding sleep apnea.  He is very interested in getting it under control.  We discussed the possibility of inspire.  I have recommended that we proceed with a drug-induced sleep endoscopy to evaluate his airway for the level of obstruction.  He would like to proceed with scheduling.    Jem Mueller MD

## 2023-12-06 PROBLEM — G47.33 OBSTRUCTIVE SLEEP APNEA: Status: ACTIVE | Noted: 2023-12-05

## 2023-12-07 ENCOUNTER — OFFICE VISIT (OUTPATIENT)
Dept: PRIMARY CARE | Facility: CLINIC | Age: 38
End: 2023-12-07
Payer: COMMERCIAL

## 2023-12-07 VITALS
DIASTOLIC BLOOD PRESSURE: 82 MMHG | OXYGEN SATURATION: 96 % | BODY MASS INDEX: 30.45 KG/M2 | WEIGHT: 194 LBS | HEIGHT: 67 IN | HEART RATE: 90 BPM | SYSTOLIC BLOOD PRESSURE: 132 MMHG

## 2023-12-07 DIAGNOSIS — Z00.00 WELL ADULT EXAM: Primary | ICD-10-CM

## 2023-12-07 DIAGNOSIS — K21.9 GASTROESOPHAGEAL REFLUX DISEASE, UNSPECIFIED WHETHER ESOPHAGITIS PRESENT: ICD-10-CM

## 2023-12-07 PROCEDURE — 3008F BODY MASS INDEX DOCD: CPT | Performed by: FAMILY MEDICINE

## 2023-12-07 PROCEDURE — 1036F TOBACCO NON-USER: CPT | Performed by: FAMILY MEDICINE

## 2023-12-07 PROCEDURE — 3079F DIAST BP 80-89 MM HG: CPT | Performed by: FAMILY MEDICINE

## 2023-12-07 PROCEDURE — 3075F SYST BP GE 130 - 139MM HG: CPT | Performed by: FAMILY MEDICINE

## 2023-12-07 PROCEDURE — 99395 PREV VISIT EST AGE 18-39: CPT | Performed by: FAMILY MEDICINE

## 2023-12-07 RX ORDER — OMEPRAZOLE 40 MG/1
40 CAPSULE, DELAYED RELEASE ORAL DAILY
Qty: 90 CAPSULE | Refills: 3 | Status: SHIPPED | OUTPATIENT
Start: 2023-12-07 | End: 2024-12-06

## 2023-12-07 ASSESSMENT — COLUMBIA-SUICIDE SEVERITY RATING SCALE - C-SSRS
6. HAVE YOU EVER DONE ANYTHING, STARTED TO DO ANYTHING, OR PREPARED TO DO ANYTHING TO END YOUR LIFE?: NO
1. IN THE PAST MONTH, HAVE YOU WISHED YOU WERE DEAD OR WISHED YOU COULD GO TO SLEEP AND NOT WAKE UP?: NO
2. HAVE YOU ACTUALLY HAD ANY THOUGHTS OF KILLING YOURSELF?: NO

## 2023-12-07 ASSESSMENT — PATIENT HEALTH QUESTIONNAIRE - PHQ9
2. FEELING DOWN, DEPRESSED OR HOPELESS: NOT AT ALL
1. LITTLE INTEREST OR PLEASURE IN DOING THINGS: NOT AT ALL
SUM OF ALL RESPONSES TO PHQ9 QUESTIONS 1 AND 2: 0

## 2023-12-07 NOTE — PROGRESS NOTES
Do you have:  Any eye problems:    N  2. Frequent nasal congestion or sneezing:  N  3. Difficulty hearing:  Y  4. Ear problems:   N  Are you troubled by:  5. Asthma or wheezing:   N  6. Frequent cough:   N  7. Shortness of breath:N  8. Hemoptysis: N  9. Hx of TB: N  Do you have or have you been told you had:  10. High blood pressure: Y, treated  11. Heart disease: N  12. Heart murmur: N  Do you ever have:  13. Chest pain or pressure with exertion:Y, noncardiac chest pain  14. Leg pains with walking up hill: N  15. Fast heartbeat or palpitations:N  16. Varicose veins: N  Do you have or are you troubled by:  17. Difficulty swallowing foods or liquids: N  18. Abdominal pains: N  19. Frequent indigestion or heartburn: Y, improved with weight loss diet and omeprazole  20. Constipation: N  21. Diarrhea or loose stools: Y, chronic and associated with dairy consumption and better with lactase supplementation  Has there been a definite change:  22. In weight recently: N  23. In bowel movements: N  Have you ever had or been told you have:  24. An ulcer: N  25. Black stools: N  26. Jaundice, hepatitis or liver problems: N  27. Gallstones or gallbladder problems: N  28. Stomach or intestinal problems: N  Have you ever:  29. Vomited blood : N  30. Blood in bowel movements: N  31. Been anemic or been treated for blood problems: N  32. Had sickle cell trait or anemia: N  33. Been refused as a blood donor:   Have you had or do you have:  34. Problems with your kidney, bladder, or prostate: N  35. Loss of control of your urine: N  36. Pain or burning with urination: N  37. Blood in your urine: N  38. Trouble starting flow of urine: N  39. Frequent urination at night: N  Have you ever been treated for or told you had:  40. Venereal disease: N  Do you have:  41. Any skin problems: N  42. Diabetes: N  43. Thyroid disease: N  Are you troubled by:  44. Frequent back pain: Y, chronic stable  45. Pain or swelling around joints: N  Have you  "ever:  46. Broken any bones: N  Are you troubled by:  47. Frequent headaches: N  48. Dizziness: N  49. Had Seizures or convulsions: N  50. Temporarily lost control of your hand or foot : N   51. Had a stroke or been paralyzed : N  52. Temporarily lost your ability to speak: N  53. Fainted or lost consciousness: N  Have you ever had:  54. Hallucinations: N  55. Much trouble with Nervousness: N  56. Do you take medications for your nerves: N  57. Trouble falling asleep or staying asleep: Y, currently untreated sleep apnea but working on getting inspire device  58. Do you feel tired even after a good night sleep: Y  59. Do you often feel down in the dumps or depressed: N  60. Do you often feel like crying without any reason: N  61. Do you think that you may be using alcohol excessively: N  62. Do you use any street drugs : N     Do have any other medical problems that are concerning to you :   Subjective   Patient ID: Be Ortega is a 38 y.o. male who presents for Establish Care and Annual Exam.    HPI     Review of Systems    Objective   /82   Pulse 90   Ht 1.702 m (5' 7\")   Wt 88 kg (194 lb)   SpO2 96%   BMI 30.38 kg/m²     Physical Exam  HENT:      Head: Normocephalic and atraumatic.      Nose: Nose normal.      Mouth/Throat:      Mouth: Mucous membranes are moist.      Pharynx: No oropharyngeal exudate.   Eyes:      Extraocular Movements: Extraocular movements intact.      Conjunctiva/sclera: Conjunctivae normal.      Pupils: Pupils are equal, round, and reactive to light.   Cardiovascular:      Rate and Rhythm: Normal rate and regular rhythm.   Pulmonary:      Effort: Pulmonary effort is normal.   Abdominal:      General: There is no distension.      Palpations: Abdomen is soft.   Musculoskeletal:      Cervical back: Normal range of motion and neck supple.   Lymphadenopathy:      Cervical: No cervical adenopathy.   Neurological:      General: No focal deficit present.      Mental Status: He " is alert.   Psychiatric:         Attention and Perception: Attention normal.         Speech: Speech normal.         Behavior: Behavior is cooperative.         Assessment/Plan   Diagnoses and all orders for this visit:  Gastroesophageal reflux disease, unspecified whether esophagitis present  Comments:  Risk benefits discussed sent in prescription  Orders:  -     omeprazole (PriLOSEC) 40 mg DR capsule; Take 1 capsule (40 mg) by mouth once daily. Do not crush or chew.  Well adult exam  Comments:   LM discussed    Reviewed cardiac workup  Recheck 6 months sooner if any issues arise

## 2024-01-02 ENCOUNTER — APPOINTMENT (OUTPATIENT)
Dept: CARDIOLOGY | Facility: HOSPITAL | Age: 39
End: 2024-01-02
Payer: COMMERCIAL

## 2024-01-15 ENCOUNTER — APPOINTMENT (OUTPATIENT)
Dept: PREADMISSION TESTING | Facility: HOSPITAL | Age: 39
End: 2024-01-15
Payer: COMMERCIAL

## 2024-01-17 ENCOUNTER — PRE-ADMISSION TESTING (OUTPATIENT)
Dept: PREADMISSION TESTING | Facility: HOSPITAL | Age: 39
End: 2024-01-17
Payer: COMMERCIAL

## 2024-01-17 VITALS
RESPIRATION RATE: 16 BRPM | DIASTOLIC BLOOD PRESSURE: 91 MMHG | TEMPERATURE: 97.5 F | OXYGEN SATURATION: 96 % | SYSTOLIC BLOOD PRESSURE: 140 MMHG | HEIGHT: 66 IN | HEART RATE: 92 BPM | WEIGHT: 198.63 LBS | BODY MASS INDEX: 31.92 KG/M2

## 2024-01-17 PROCEDURE — 99203 OFFICE O/P NEW LOW 30 MIN: CPT | Performed by: NURSE PRACTITIONER

## 2024-01-17 RX ORDER — NITROGLYCERIN 0.4 MG/1
0.4 TABLET SUBLINGUAL EVERY 5 MIN PRN
COMMUNITY

## 2024-01-17 ASSESSMENT — DUKE ACTIVITY SCORE INDEX (DASI)
CAN YOU TAKE CARE OF YOURSELF (EAT, DRESS, BATHE, OR USE TOILET): YES
CAN YOU PARTICIPATE IN STRENOUS SPORTS LIKE SWIMMING, SINGLES TENNIS, FOOTBALL, BASKETBALL, OR SKIING: YES
CAN YOU DO YARD WORK LIKE RAKING LEAVES, WEEDING OR PUSHING A MOWER: YES
CAN YOU DO LIGHT WORK AROUND THE HOUSE LIKE DUSTING OR WASHING DISHES: YES
CAN YOU RUN A SHORT DISTANCE: YES
TOTAL_SCORE: 58.2
DASI METS SCORE: 9.9
CAN YOU WALK A BLOCK OR TWO ON LEVEL GROUND: YES
CAN YOU DO MODERATE WORK AROUND THE HOUSE LIKE VACUUMING, SWEEPING FLOORS OR CARRYING GROCERIES: YES
CAN YOU DO HEAVY WORK AROUND THE HOUSE LIKE SCRUBBING FLOORS OR LIFTING AND MOVING HEAVY FURNITURE: YES
CAN YOU PARTICIPATE IN MODERATE RECREATIONAL ACTIVITIES LIKE GOLF, BOWLING, DANCING, DOUBLES TENNIS OR THROWING A BASEBALL OR FOOTBALL: YES
CAN YOU HAVE SEXUAL RELATIONS: YES
CAN YOU WALK INDOORS, SUCH AS AROUND YOUR HOUSE: YES
CAN YOU CLIMB A FLIGHT OF STAIRS OR WALK UP A HILL: YES

## 2024-01-17 ASSESSMENT — PAIN SCALES - GENERAL: PAINLEVEL_OUTOF10: 0 - NO PAIN

## 2024-01-17 ASSESSMENT — CHADS2 SCORE
PRIOR STROKE OR TIA OR THROMBOEMBOLISM: NO
HYPERTENSION: YES
DIABETES: NO
AGE GREATER THAN OR EQUAL TO 75: NO
CHF: NO
CHADS2 SCORE: 1

## 2024-01-17 ASSESSMENT — ENCOUNTER SYMPTOMS
EYES NEGATIVE: 1
NEUROLOGICAL NEGATIVE: 1
MUSCULOSKELETAL NEGATIVE: 1
FATIGUE: 1
PSYCHIATRIC NEGATIVE: 1
CARDIOVASCULAR NEGATIVE: 1
GASTROINTESTINAL NEGATIVE: 1
RESPIRATORY NEGATIVE: 1
HEMATOLOGIC/LYMPHATIC NEGATIVE: 1

## 2024-01-17 ASSESSMENT — PAIN - FUNCTIONAL ASSESSMENT: PAIN_FUNCTIONAL_ASSESSMENT: 0-10

## 2024-01-17 NOTE — CPM/PAT H&P
CPM/PAT Evaluation       Name: eB Ortega (Be Ortega)  /Age: 1985/38 y.o.     In-Person       Chief Complaint: Obstructive sleep apnea, BMI    HPI  Active 38-year-old male with obstructive sleep apnea.  History of obstructive sleep apnea diagnosed 8 years ago.  Patient is no longer able to tolerate CPAP/ Bipap/ mouthpiece.  Endorses daytime sleepiness affecting quality of life.  Denies fever, chills, headache, chest pain, shortness of breath, or syncope.  He is scheduled for drug-induced sleep endoscopy.    Past Medical History:   Diagnosis Date    Angina pectoris (CMS/HCC)     Asthma     Cutaneous abscess of buttock 2016    Abscess of buttock    GERD (gastroesophageal reflux disease)     Hypercholesteremia     Hypertension     Other chest pain 2020    Atypical chest pain    Other muscle spasm 2015    Trapezius muscle spasm    Other specified diseases of anus and rectum 2016    Rectal pain    Sleep apnea        Past Surgical History:   Procedure Laterality Date    CARDIAC CATHETERIZATION      CARDIOVASCULAR STRESS TEST      2023         No Known Allergies    Current Outpatient Medications   Medication Sig Dispense Refill    amLODIPine (Norvasc) 5 mg tablet Take 1 tablet (5 mg) by mouth once daily. 30 tablet 11    nitroglycerin (Nitrostat) 0.4 mg SL tablet Place 1 tablet (0.4 mg) under the tongue every 5 minutes if needed for chest pain (ANGINA).      omeprazole (PriLOSEC) 40 mg DR capsule Take 1 capsule (40 mg) by mouth once daily. Do not crush or chew. 90 capsule 3     No current facility-administered medications for this visit.     Review of Systems   Constitutional:  Positive for fatigue.   HENT: Negative.     Eyes: Negative.         Contacts   Respiratory: Negative.          Sleep apnea   Cardiovascular: Negative.    Gastrointestinal: Negative.    Genitourinary: Negative.    Musculoskeletal: Negative.    Skin: Negative.    Neurological: Negative.   "  Hematological: Negative.    Psychiatric/Behavioral: Negative.          Physical Exam  Vitals reviewed.   Constitutional:       Appearance: Normal appearance.   HENT:      Head: Normocephalic and atraumatic.      Mouth/Throat:      Mouth: Mucous membranes are moist.   Eyes:      Pupils: Pupils are equal, round, and reactive to light.      Comments: contacts   Cardiovascular:      Rate and Rhythm: Normal rate and regular rhythm.   Pulmonary:      Effort: Pulmonary effort is normal.      Breath sounds: Normal breath sounds.   Abdominal:      Palpations: Abdomen is soft.   Musculoskeletal:         General: Normal range of motion.      Cervical back: Normal range of motion.   Skin:     General: Skin is warm and dry.   Neurological:      Mental Status: He is alert and oriented to person, place, and time.   Psychiatric:         Mood and Affect: Mood normal.          PAT AIRWAY:   Airway:     Mallampati::  II    Neck ROM::  Full  normal        BP (!) 140/91   Pulse 92   Temp 36.4 °C (97.5 °F) (Temporal)   Resp 16   Ht 1.676 m (5' 6\")   Wt 90.1 kg (198 lb 10.2 oz)   SpO2 96%   BMI 32.06 kg/m²       Stop Bang Score 6     CHADS 2 score: 2.8%  DASI score: 58.2  METS score: 9.9  Revised cardiac risk index: 0.9%  ASA II  ARISCAT 1.6%      Assessment and Plan:     Obstructive sleep apnea, BMI Plan: Drug-induced sleep endoscopy  History of angina follows with cardiology-cardiac stress test 12/1/2023-normal LVEF 55 to 60% on 12/1/2023  GERD  BMI 32.06        "

## 2024-01-17 NOTE — PREPROCEDURE INSTRUCTIONS
PAT DISCHARGE INSTRUCTIONS    Please call the Same Day Surgery (SDS) Department of the hospital where your procedure will be performed after 2:00 PM the day before your surgery. If you are scheduled on a Monday, or a Tuesday following a Monday holiday, you will need to call on the last business day prior to your surgery.    [facility]  [address] [address], [address]  [phone]    [facility]  [address] [place] [address] [phone]    [facility]  [address]  [place] [id number]  [phone]    Please let your surgeon know if:      You develop any open sores, shingles, burning or painful urination as these may increase your risk of an infection.   You no longer wish to have the surgery.   Any other personal circumstances change that may lead to the need to cancel or defer this surgery-such as being sick or getting admitted to any hospital within one week of your planned procedure.    Your contact details change, such as a change of address or phone number.    Starting now:     Please DO NOT drink alcohol or smoke for 24 hours before surgery. It is well known that quitting smoking can make a huge difference to your health and recovery from surgery. The longer you abstain from smoking, the better your chances of a healthy recovery. If you need help with quitting, call 9-800-QUIT-NOW to be connected to a trained counselor who will discuss the best methods to help you quit.     Before your surgery:    Please stop all supplements 7 days prior to surgery. Or as directed by your surgeon.   Please stop taking NSAID pain medicine such as Advil and Motrin 7 days before surgery.    If you develop any fever, cough, cold, rashes, cuts, scratches, scrapes, urinary symptoms or infection anywhere on your body (including teeth and gums) prior to surgery, please call your surgeon’s office as soon as  possible. This may require treatment to reduce the chance of cancellation on the day of surgery.    The day before your surgery:   DIET- Do not eat any food after MIDNIGHT. May have 10 ounces of CLEAR LIQUIDS until TWO HOURS before your arrival time. This includes water, black tea or coffee (no milk ir cream), apple juice and electrolyte drinks (Gatorade). May chew gum until TWO hours before your surgery time.   Get a good night’s rest.  Use the special soap for bathing if you have been instructed to use one.    Scheduled surgery times may change and you will be notified if this occurs - please check your personal voicemail for any updates.     On the morning of surgery:   Wear comfortable, loose fitting clothes which open in the front. Please do not wear moisturizers, creams, lotions, makeup or perfume.    Please bring with you to surgery:   Photo ID and insurance card   Current list of medicines and allergies   Pacemaker/ Defibrillator/Heart stent cards   CPAP machine and mask    Slings/ splints/ crutches   A copy of your complete advanced directive/DHPOA.    Please do NOT bring with you to surgery:   All jewelry and valuables should be left at home.   Prosthetic devices such as contact lenses, hearing aids, dentures, eyelash extensions, hairpins and body piercings must be removed prior to going in to the surgical suite.    After outpatient surgery:   A responsible adult MUST accompany you at the time of discharge and stay with you for 24 hours after your surgery. You may NOT drive yourself home after surgery.    Do not drive, operate machinery, make critical decisions or do activities that require co-ordination or balance until after a night’s sleep.   Do not drink alcoholic beverages for 24 hours.   Instructions for resuming your medications will be provided by your surgeon.    CALL YOUR DOCTOR AFTER SURGERY IF YOU HAVE:     Chills and/or a fever of 101° F or higher.    Redness, swelling, pus or drainage from  your surgical wound or a bad smell from the wound.    Lightheadedness, fainting or confusion.    Persistent vomiting (throwing up) and are not able to eat or drink for 12 hours.    Three or more loose, watery bowel movements in 24 hours (diarrhea).   Difficulty or pain while urinating( after non-urological surgery)    Pain and swelling in your legs, especially if it is only on one side.    Difficulty breathing or are breathing faster than normal.    Any new concerning symptoms.     Medication List            Accurate as of January 17, 2024  3:17 PM. Always use your most recent med list.                amLODIPine 5 mg tablet  Commonly known as: Norvasc  Take 1 tablet (5 mg) by mouth once daily.  Medication Adjustments for Surgery: Take morning of surgery with sip of water, no other fluids     omeprazole 40 mg DR capsule  Commonly known as: PriLOSEC  Take 1 capsule (40 mg) by mouth once daily. Do not crush or chew.  Medication Adjustments for Surgery: Take morning of surgery with sip of water, no other fluids

## 2024-01-17 NOTE — H&P (VIEW-ONLY)
CPM/PAT Evaluation       Name: Be Ortega (Be Ortega)  /Age: 1985/38 y.o.     In-Person       Chief Complaint: Obstructive sleep apnea, BMI    HPI  Active 38-year-old male with obstructive sleep apnea.  History of obstructive sleep apnea diagnosed 8 years ago.  Patient is no longer able to tolerate CPAP/ Bipap/ mouthpiece.  Endorses daytime sleepiness affecting quality of life.  Denies fever, chills, headache, chest pain, shortness of breath, or syncope.  He is scheduled for drug-induced sleep endoscopy.    Past Medical History:   Diagnosis Date    Angina pectoris (CMS/HCC)     Asthma     Cutaneous abscess of buttock 2016    Abscess of buttock    GERD (gastroesophageal reflux disease)     Hypercholesteremia     Hypertension     Other chest pain 2020    Atypical chest pain    Other muscle spasm 2015    Trapezius muscle spasm    Other specified diseases of anus and rectum 2016    Rectal pain    Sleep apnea        Past Surgical History:   Procedure Laterality Date    CARDIAC CATHETERIZATION      CARDIOVASCULAR STRESS TEST      2023         No Known Allergies    Current Outpatient Medications   Medication Sig Dispense Refill    amLODIPine (Norvasc) 5 mg tablet Take 1 tablet (5 mg) by mouth once daily. 30 tablet 11    nitroglycerin (Nitrostat) 0.4 mg SL tablet Place 1 tablet (0.4 mg) under the tongue every 5 minutes if needed for chest pain (ANGINA).      omeprazole (PriLOSEC) 40 mg DR capsule Take 1 capsule (40 mg) by mouth once daily. Do not crush or chew. 90 capsule 3     No current facility-administered medications for this visit.     Review of Systems   Constitutional:  Positive for fatigue.   HENT: Negative.     Eyes: Negative.         Contacts   Respiratory: Negative.          Sleep apnea   Cardiovascular: Negative.    Gastrointestinal: Negative.    Genitourinary: Negative.    Musculoskeletal: Negative.    Skin: Negative.    Neurological: Negative.   "  Hematological: Negative.    Psychiatric/Behavioral: Negative.          Physical Exam  Vitals reviewed.   Constitutional:       Appearance: Normal appearance.   HENT:      Head: Normocephalic and atraumatic.      Mouth/Throat:      Mouth: Mucous membranes are moist.   Eyes:      Pupils: Pupils are equal, round, and reactive to light.      Comments: contacts   Cardiovascular:      Rate and Rhythm: Normal rate and regular rhythm.   Pulmonary:      Effort: Pulmonary effort is normal.      Breath sounds: Normal breath sounds.   Abdominal:      Palpations: Abdomen is soft.   Musculoskeletal:         General: Normal range of motion.      Cervical back: Normal range of motion.   Skin:     General: Skin is warm and dry.   Neurological:      Mental Status: He is alert and oriented to person, place, and time.   Psychiatric:         Mood and Affect: Mood normal.          PAT AIRWAY:   Airway:     Mallampati::  II    Neck ROM::  Full  normal        BP (!) 140/91   Pulse 92   Temp 36.4 °C (97.5 °F) (Temporal)   Resp 16   Ht 1.676 m (5' 6\")   Wt 90.1 kg (198 lb 10.2 oz)   SpO2 96%   BMI 32.06 kg/m²       Stop Bang Score 6     CHADS 2 score: 2.8%  DASI score: 58.2  METS score: 9.9  Revised cardiac risk index: 0.9%  ASA II  ARISCAT 1.6%      Assessment and Plan:     Obstructive sleep apnea, BMI Plan: Drug-induced sleep endoscopy  History of angina follows with cardiology-cardiac stress test 12/1/2023-normal LVEF 55 to 60% on 12/1/2023  GERD  BMI 32.06        "

## 2024-01-22 ENCOUNTER — HOSPITAL ENCOUNTER (OUTPATIENT)
Facility: HOSPITAL | Age: 39
Setting detail: OUTPATIENT SURGERY
Discharge: HOME | End: 2024-01-22
Attending: OTOLARYNGOLOGY | Admitting: OTOLARYNGOLOGY
Payer: COMMERCIAL

## 2024-01-22 ENCOUNTER — ANESTHESIA (OUTPATIENT)
Dept: OPERATING ROOM | Facility: HOSPITAL | Age: 39
End: 2024-01-22
Payer: COMMERCIAL

## 2024-01-22 ENCOUNTER — ANESTHESIA EVENT (OUTPATIENT)
Dept: OPERATING ROOM | Facility: HOSPITAL | Age: 39
End: 2024-01-22
Payer: COMMERCIAL

## 2024-01-22 VITALS
SYSTOLIC BLOOD PRESSURE: 126 MMHG | OXYGEN SATURATION: 99 % | RESPIRATION RATE: 14 BRPM | HEART RATE: 72 BPM | DIASTOLIC BLOOD PRESSURE: 76 MMHG | TEMPERATURE: 97.5 F

## 2024-01-22 DIAGNOSIS — G47.33 OBSTRUCTIVE SLEEP APNEA: Primary | ICD-10-CM

## 2024-01-22 PROCEDURE — 7100000001 HC RECOVERY ROOM TIME - INITIAL BASE CHARGE: Performed by: OTOLARYNGOLOGY

## 2024-01-22 PROCEDURE — 7100000009 HC PHASE TWO TIME - INITIAL BASE CHARGE: Performed by: OTOLARYNGOLOGY

## 2024-01-22 PROCEDURE — 2500000001 HC RX 250 WO HCPCS SELF ADMINISTERED DRUGS (ALT 637 FOR MEDICARE OP): Performed by: OTOLARYNGOLOGY

## 2024-01-22 PROCEDURE — 42975 DISE EVAL SLP DO BRTH FLX DX: CPT | Performed by: OTOLARYNGOLOGY

## 2024-01-22 PROCEDURE — 3700000001 HC GENERAL ANESTHESIA TIME - INITIAL BASE CHARGE: Performed by: OTOLARYNGOLOGY

## 2024-01-22 PROCEDURE — 3700000002 HC GENERAL ANESTHESIA TIME - EACH INCREMENTAL 1 MINUTE: Performed by: OTOLARYNGOLOGY

## 2024-01-22 PROCEDURE — 3600000006 HC OR TIME - EACH INCREMENTAL 1 MINUTE - PROCEDURE LEVEL ONE: Performed by: OTOLARYNGOLOGY

## 2024-01-22 PROCEDURE — 2500000004 HC RX 250 GENERAL PHARMACY W/ HCPCS (ALT 636 FOR OP/ED): Performed by: NURSE ANESTHETIST, CERTIFIED REGISTERED

## 2024-01-22 PROCEDURE — 7100000010 HC PHASE TWO TIME - EACH INCREMENTAL 1 MINUTE: Performed by: OTOLARYNGOLOGY

## 2024-01-22 PROCEDURE — 2720000007 HC OR 272 NO HCPCS: Performed by: OTOLARYNGOLOGY

## 2024-01-22 PROCEDURE — A42975: Performed by: ANESTHESIOLOGY

## 2024-01-22 PROCEDURE — A42975: Performed by: NURSE ANESTHETIST, CERTIFIED REGISTERED

## 2024-01-22 PROCEDURE — 7100000002 HC RECOVERY ROOM TIME - EACH INCREMENTAL 1 MINUTE: Performed by: OTOLARYNGOLOGY

## 2024-01-22 PROCEDURE — 3600000001 HC OR TIME - INITIAL BASE CHARGE - PROCEDURE LEVEL ONE: Performed by: OTOLARYNGOLOGY

## 2024-01-22 RX ORDER — PROPOFOL 10 MG/ML
INJECTION, EMULSION INTRAVENOUS AS NEEDED
Status: DISCONTINUED | OUTPATIENT
Start: 2024-01-22 | End: 2024-01-22

## 2024-01-22 RX ORDER — OXYMETAZOLINE HCL 0.05 %
2 SPRAY, NON-AEROSOL (ML) NASAL ONCE
Status: COMPLETED | OUTPATIENT
Start: 2024-01-22 | End: 2024-01-22

## 2024-01-22 RX ORDER — SODIUM CHLORIDE, SODIUM LACTATE, POTASSIUM CHLORIDE, CALCIUM CHLORIDE 600; 310; 30; 20 MG/100ML; MG/100ML; MG/100ML; MG/100ML
100 INJECTION, SOLUTION INTRAVENOUS CONTINUOUS
Status: DISCONTINUED | OUTPATIENT
Start: 2024-01-22 | End: 2024-01-22 | Stop reason: HOSPADM

## 2024-01-22 RX ORDER — ONDANSETRON HYDROCHLORIDE 2 MG/ML
4 INJECTION, SOLUTION INTRAVENOUS ONCE AS NEEDED
Status: DISCONTINUED | OUTPATIENT
Start: 2024-01-22 | End: 2024-01-22 | Stop reason: HOSPADM

## 2024-01-22 RX ORDER — ALBUTEROL SULFATE 0.83 MG/ML
2.5 SOLUTION RESPIRATORY (INHALATION) ONCE AS NEEDED
Status: DISCONTINUED | OUTPATIENT
Start: 2024-01-22 | End: 2024-01-22 | Stop reason: HOSPADM

## 2024-01-22 RX ORDER — FENTANYL CITRATE 50 UG/ML
50 INJECTION, SOLUTION INTRAMUSCULAR; INTRAVENOUS EVERY 5 MIN PRN
Status: DISCONTINUED | OUTPATIENT
Start: 2024-01-22 | End: 2024-01-22 | Stop reason: HOSPADM

## 2024-01-22 RX ORDER — LABETALOL HYDROCHLORIDE 5 MG/ML
5 INJECTION, SOLUTION INTRAVENOUS ONCE AS NEEDED
Status: DISCONTINUED | OUTPATIENT
Start: 2024-01-22 | End: 2024-01-22 | Stop reason: HOSPADM

## 2024-01-22 RX ADMIN — PROPOFOL 70 MG: 10 INJECTION, EMULSION INTRAVENOUS at 11:31

## 2024-01-22 RX ADMIN — PROPOFOL 50 MG: 10 INJECTION, EMULSION INTRAVENOUS at 11:32

## 2024-01-22 RX ADMIN — OXYMETAZOLINE HYDROCHLORIDE 2 SPRAY: 0.05 SPRAY NASAL at 11:09

## 2024-01-22 SDOH — HEALTH STABILITY: MENTAL HEALTH: CURRENT SMOKER: 0

## 2024-01-22 ASSESSMENT — COLUMBIA-SUICIDE SEVERITY RATING SCALE - C-SSRS
6. HAVE YOU EVER DONE ANYTHING, STARTED TO DO ANYTHING, OR PREPARED TO DO ANYTHING TO END YOUR LIFE?: NO
2. HAVE YOU ACTUALLY HAD ANY THOUGHTS OF KILLING YOURSELF?: NO
1. IN THE PAST MONTH, HAVE YOU WISHED YOU WERE DEAD OR WISHED YOU COULD GO TO SLEEP AND NOT WAKE UP?: NO

## 2024-01-22 ASSESSMENT — PAIN SCALES - GENERAL
PAINLEVEL_OUTOF10: 0 - NO PAIN
PAIN_LEVEL: 5
PAINLEVEL_OUTOF10: 0 - NO PAIN

## 2024-01-22 ASSESSMENT — PAIN - FUNCTIONAL ASSESSMENT
PAIN_FUNCTIONAL_ASSESSMENT: 0-10

## 2024-01-22 NOTE — ANESTHESIA POSTPROCEDURE EVALUATION
Patient: Be Ortega    Procedure Summary       Date: 01/22/24 Room / Location: [facility] / [facility]    Anesthesia Start: 1129 Anesthesia Stop: 1148    Procedure: Drug Induced Sleep Endoscopy (Nose) Diagnosis:       Obstructive sleep apnea      BMI 30.0-30.9,adult      (Obstructive sleep apnea [G47.33])      (BMI 30.0-30.9,adult [Z68.30])    Surgeons: Jem Mueller MD Responsible Provider: Diego Girard MD    Anesthesia Type: MAC ASA Status: 2            Anesthesia Type: MAC    Vitals Value Taken Time   /76 01/22/24 1300   Temp 36 01/22/24 1434   Pulse 72 01/22/24 1300   Resp 14 01/22/24 1300   SpO2 99 % 01/22/24 1300       Anesthesia Post Evaluation    Patient location during evaluation: PACU  Patient participation: complete - patient participated  Level of consciousness: awake and alert  Pain score: 5  Pain management: adequate  Multimodal analgesia pain management approach  Airway patency: patent  Two or more strategies used to mitigate risk of obstructive sleep apnea  Cardiovascular status: acceptable and blood pressure returned to baseline  Respiratory status: acceptable  Hydration status: acceptable  Postoperative Nausea and Vomiting: none  Comments: PONV absent        There were no known notable events for this encounter.

## 2024-01-22 NOTE — ANESTHESIA PREPROCEDURE EVALUATION
Patient: Be Ortega    Procedure Information       Date/Time: 01/22/24 1145    Procedure: Drug Induced Sleep Endoscopy    Location: [facility] / [facility]    Surgeons: Jem Mueller MD            Relevant Problems   Anesthesia   (+) Left corneal abrasion      Cardiovascular   (+) Chest pain   (+) Essential hypertension   (+) Hypercholesterolemia   (+) Non-ST elevation myocardial infarction (NSTEMI) in recovery phase (CMS/HCC)      Endocrine   (+) Class 1 obesity with body mass index (BMI) of 32.0 to 32.9 in adult      GI   (+) GERD (gastroesophageal reflux disease)      Pulmonary   (+) NELY (obstructive sleep apnea)   (+) Obstructive sleep apnea   (+) SOB (shortness of breath) on exertion     Past Surgical History:   Procedure Laterality Date    CARDIAC CATHETERIZATION      CARDIOVASCULAR STRESS TEST      12/2023    WISDOM TOOTH EXTRACTION         Clinical information reviewed:   Tobacco  Allergies  Meds   Med Hx  Surg Hx   Fam Hx  Soc Hx        NPO Detail:  NPO/Void Status  Carbohydrate Drink Given Prior to Surgery? : N  Date of Last Liquid: 01/22/24  Time of Last Liquid: 0700  Date of Last Solid: 01/22/24  Time of Last Solid: 2330  Last Intake Type: Clear fluids; Solid meal  Time of Last Void: 1000         Physical Exam    Airway  Mallampati: II  TM distance: >3 FB  Neck ROM: full  Comments: Full beard   Cardiovascular   Comments: deferred   Dental    Pulmonary   Comments: deferred   Abdominal     Comments: deferred           Anesthesia Plan    History of general anesthesia?: yes  History of complications of general anesthesia?: no    ASA 2     MAC     The patient is not a current smoker.    intravenous induction   Anesthetic plan and risks discussed with patient.    Plan discussed with CRNA.

## 2024-01-22 NOTE — POST-PROCEDURE NOTE
1226- pt brought back from pacu,verbal report received. Pt is alert and awake. Wife called for pickup. Snack and drink given.     1230 - pt ambulated to BR with steady gait.     1255- vss. Discharge instructions given and explained to pt. Pt verbally understood.     1300- pt getting dressed at this time with steady gait.     1315- awaiting wife for pickup.     1317- pt brought down to [facility] via wheelchair with this nurse where he was picked up by his wife.

## 2024-01-22 NOTE — OP NOTE
Drug Induced Sleep Endoscopy Operative Note          Date: 2024  OR Location: [facility]    Name: Be Ortega : 1985 Age: 38 y.o. MRN: [id number]  Sex: male      Diagnosis  Pre-op Diagnosis    Pre-Op Diagnosis Codes:     * Obstructive sleep apnea [G47.33]     * BMI 30.0-30.9,adult [Z68.30] Post-op Diagnosis     same     Procedures    Drug Induced Sleep Endoscopy  34402 - CA DISE DYN EVAL SLEEP DISORDERED BREATHING FLX DX       Surgeons      Jem Mueller MD     Assistant:  See OR log    Procedure Summary  Anesthesia: General  Anesthesia Staff: Diego Girard MD   Estimated Blood Loss: none      Findings: Anterior posterior closure of the soft palate/velum with little to no lateral wall collapse    Indications: Be Ortega is an 38 y.o.  male  who is having surgery for OBSTRUCTIVE SLEEP APNEA.  The patient has obstructive sleep apnea and is unable to tolerate CPAP.  Drug-induced sleep endoscopy was recommended to evaluate her airway for the level of obstruction    The patient was seen in the preoperative area. The risks, benefits, complications, treatment options, non-operative alternatives, expected recovery and outcomes were discussed with the patient. The possibilities of reaction to medication, pulmonary aspiration, injury to surrounding structures, bleeding, recurrent infection, the need for additional procedures, failure to diagnose a condition, and creating a complication requiring transfusion or operation were discussed with the patient. The patient concurred with the proposed plan, giving informed consent.  The site of surgery was properly noted/marked if necessary per policy. The patient has been actively warmed in preoperative area. Preoperative antibiotics are not indicated. Venous thrombosis prophylaxis are not indicated.    Procedure Details: The patient was brought to the operating room placed on the operating table in the supine position.  After blood pressure and  cardiac monitors were applied the patient was placed under IV sedation with a propofol protocol for drug-induced sleep endoscopy.  Once the patient was unconscious, snoring and showing evidence of apnea flexible nasal endoscopy was carried out.  The velum closed in an anterior posterior direction with no significant lateral wall collapse.  The base of tongue was noted to be enlarged.  The epiglottis was normal.  The larynx appeared normal.  Complications:  None; patient tolerated the procedure well.    Disposition: PACU - hemodynamically stable.  Condition: stable         Additional Details: Favorable anatomy to consider inspire    Attending Attestation: I performed the procedure.    Jem Mueller  Phone Number: [phone]       ,[email or web address]

## 2024-01-23 ASSESSMENT — PAIN SCALES - GENERAL: PAINLEVEL_OUTOF10: 0 - NO PAIN

## 2024-01-31 ENCOUNTER — OFFICE VISIT (OUTPATIENT)
Dept: OTOLARYNGOLOGY | Facility: CLINIC | Age: 39
End: 2024-01-31
Payer: COMMERCIAL

## 2024-01-31 ENCOUNTER — PREP FOR PROCEDURE (OUTPATIENT)
Dept: OTOLARYNGOLOGY | Facility: HOSPITAL | Age: 39
End: 2024-01-31

## 2024-01-31 VITALS — WEIGHT: 196 LBS | BODY MASS INDEX: 31.5 KG/M2 | HEIGHT: 66 IN | TEMPERATURE: 97.5 F

## 2024-01-31 DIAGNOSIS — G47.33 OBSTRUCTIVE SLEEP APNEA: Primary | ICD-10-CM

## 2024-01-31 DIAGNOSIS — J34.2 DEVIATED NASAL SEPTUM: ICD-10-CM

## 2024-01-31 PROCEDURE — 1036F TOBACCO NON-USER: CPT | Performed by: OTOLARYNGOLOGY

## 2024-01-31 PROCEDURE — 99214 OFFICE O/P EST MOD 30 MIN: CPT | Performed by: OTOLARYNGOLOGY

## 2024-01-31 PROCEDURE — 3008F BODY MASS INDEX DOCD: CPT | Performed by: OTOLARYNGOLOGY

## 2024-01-31 RX ORDER — CEFAZOLIN SODIUM 2 G/100ML
2 INJECTION, SOLUTION INTRAVENOUS ONCE
Status: CANCELLED | OUTPATIENT
Start: 2024-04-11 | End: 2024-01-31

## 2024-01-31 RX ORDER — SODIUM CHLORIDE, SODIUM LACTATE, POTASSIUM CHLORIDE, CALCIUM CHLORIDE 600; 310; 30; 20 MG/100ML; MG/100ML; MG/100ML; MG/100ML
100 INJECTION, SOLUTION INTRAVENOUS CONTINUOUS
Status: CANCELLED | OUTPATIENT
Start: 2024-04-11

## 2024-01-31 NOTE — PROGRESS NOTES
"Chief Complaint   Patient presents with    Post-op     2 WK POST OP DISE, NOT APPROVED YET FOR INSPIRE      Date of Evaluation: 1/31/2024   HPI  He returns status post days for obstructive sleep apnea.  His anatomy was favorable to consider inspire.  Be Ortega is a 38 y.o. male seen in consultation at the request of Dr. Barrientos for evaluation for inspire.  He is a  who has a several year history of obstructive sleep apnea.  He has been tried on CPAP BiPAP and dental appliance without continued success.  He winds up ripping it off throughout the night.  His most recent sleep study was September 2023 that showed an RDI 4% of 19.7 with minimal central apnea.  His oxygen tierney was 82%.  He does have hypertension and a family history of coronary artery disease.  He is following with cardiology and being worked up.  He has daytime tiredness and very much wants his sleep apnea under control       Past Medical History:   Diagnosis Date    Angina pectoris (CMS/HCC)     Asthma     Cutaneous abscess of buttock 01/27/2016    Abscess of buttock    GERD (gastroesophageal reflux disease)     Hypercholesteremia     Hypertension     Other chest pain 06/09/2020    Atypical chest pain    Other muscle spasm 09/16/2015    Trapezius muscle spasm    Other specified diseases of anus and rectum 01/26/2016    Rectal pain    Sleep apnea       Past Surgical History:   Procedure Laterality Date    CARDIAC CATHETERIZATION      CARDIOVASCULAR STRESS TEST      12/2023    WISDOM TOOTH EXTRACTION            Medications:   Current Outpatient Medications   Medication Instructions    amLODIPine (NORVASC) 5 mg, oral, Daily    nitroglycerin (NITROSTAT) 0.4 mg, sublingual, Every 5 min PRN    omeprazole (PRILOSEC) 40 mg, oral, Daily, Do not crush or chew.        Allergies:  No Known Allergies     Physical Exam:  Last Recorded Vitals  Temperature 36.4 °C (97.5 °F), height 1.676 m (5' 6\"), weight 88.9 kg (196 lb).  []General " appearance: Well-developed, well-nourished in no acute distress, conversant with normal voice quality    Head/face: No erythema or edema or facial tenderness, and normal facial nerve function bilaterally    External ear: Clear external auditory canals with normal pinnae  Tube status: N/A  Middle ear: Tympanic membranes intact and mobile, middle ears normal.  Tympanic membrane perforation: N/A  Mastoid bowl: N/A  Hearing: Normal conversational awareness at normal speech thresholds    Nose visualized using: Anterior rhinoscopy  Nasal dorsum: Nontraumatic midline appearance  Septum: Deviated left  Inferior turbinates: Normal, pink, hypertrophy  Secretions: Dry    Oral cavity and oropharynx: Normal  Teeth: Good condition  Floor of mouth: without lesions  Palate: Normal hard palate, soft palate and uvula  Oropharynx: Clear, no lesions present  Buccal mucosa: Normal without masses or lesions  Lips: Normal    Nasopharynx: Inadequate mirror exam secondary to gag/anatomy    Neck:  Salivary glands: Normal bilateral parotid and submandibular glands by inspection and palpation.  Non-thyroid masses: No palpable masses or significant lymphadenopathy  Trachea: Midline  Thyroid: No thyromegaly or palpable nodules  Temporomandibular joint: Nontender  Cervical range of motion: Normal    Neurologic exam: Alert and oriented x3, appropriate affect.  Cranial nerves II-XII normal bilaterally  Extraocular movement: Extraocular movement intact, normal gaze alignment        Be was seen today for post-op.  Diagnoses and all orders for this visit:  Obstructive sleep apnea (Primary)  Deviated nasal septum  BMI 30.0-30.9,adult         PLAN  I have recommended proceeding with implantation of hypoglossal nerve stimulator (inspire) and chest wall respiratory sensor lead.  I discussed the surgery in great detail including the risks of bleeding infection pneumothorax hypoglossal nerve injury persistent apnea battery life MRI restrictions  etc.  The patient understands the risks and benefits and would like to proceed with scheduling.    Jem Mueller MD

## 2024-01-31 NOTE — LETTER
January 31, 2024     Blas Garcia MD  [address]  [address]  [facility] [address]    Patient: Be Ortega   YOB: 1985   Date of Visit: 1/31/2024       Dear Dr. Blas Garcia MD:    Thank you for referring Be Ortega to me for evaluation. Below are my notes for this consultation.  If you have questions, please do not hesitate to call me. I look forward to following your patient along with you.       Sincerely,     Jem Mueller MD      CC: Huong Barrientos MD  ______________________________________________________________________________________    Chief Complaint   Patient presents with   • Post-op     2 WK POST OP DISE, NOT APPROVED YET FOR INSPIRE      Date of Evaluation: 1/31/2024   HPI  He returns status post days for obstructive sleep apnea.  His anatomy was favorable to consider inspire.  Be Ortega is a 38 y.o. male seen in consultation at the request of Dr. Barrientos for evaluation for inspire.  He is a  who has a several year history of obstructive sleep apnea.  He has been tried on CPAP BiPAP and dental appliance without continued success.  He winds up ripping it off throughout the night.  His most recent sleep study was September 2023 that showed an RDI 4% of 19.7 with minimal central apnea.  His oxygen tierney was 82%.  He does have hypertension and a family history of coronary artery disease.  He is following with cardiology and being worked up.  He has daytime tiredness and very much wants his sleep apnea under control       Past Medical History:   Diagnosis Date   • Angina pectoris (CMS/HCC)    • Asthma    • Cutaneous abscess of buttock 01/27/2016    Abscess of buttock   • GERD (gastroesophageal reflux disease)    • Hypercholesteremia    • Hypertension    • Other chest pain 06/09/2020    Atypical chest pain   • Other muscle spasm 09/16/2015    Trapezius muscle spasm   • Other specified diseases of anus and rectum 01/26/2016    Rectal pain   •  "Sleep apnea       Past Surgical History:   Procedure Laterality Date   • CARDIAC CATHETERIZATION     • CARDIOVASCULAR STRESS TEST      12/2023   • WISDOM TOOTH EXTRACTION            Medications:   Current Outpatient Medications   Medication Instructions   • amLODIPine (NORVASC) 5 mg, oral, Daily   • nitroglycerin (NITROSTAT) 0.4 mg, sublingual, Every 5 min PRN   • omeprazole (PRILOSEC) 40 mg, oral, Daily, Do not crush or chew.        Allergies:  No Known Allergies     Physical Exam:  Last Recorded Vitals  Temperature 36.4 °C (97.5 °F), height 1.676 m (5' 6\"), weight 88.9 kg (196 lb).  []General appearance: Well-developed, well-nourished in no acute distress, conversant with normal voice quality    Head/face: No erythema or edema or facial tenderness, and normal facial nerve function bilaterally    External ear: Clear external auditory canals with normal pinnae  Tube status: N/A  Middle ear: Tympanic membranes intact and mobile, middle ears normal.  Tympanic membrane perforation: N/A  Mastoid bowl: N/A  Hearing: Normal conversational awareness at normal speech thresholds    Nose visualized using: Anterior rhinoscopy  Nasal dorsum: Nontraumatic midline appearance  Septum: Deviated left  Inferior turbinates: Normal, pink, hypertrophy  Secretions: Dry    Oral cavity and oropharynx: Normal  Teeth: Good condition  Floor of mouth: without lesions  Palate: Normal hard palate, soft palate and uvula  Oropharynx: Clear, no lesions present  Buccal mucosa: Normal without masses or lesions  Lips: Normal    Nasopharynx: Inadequate mirror exam secondary to gag/anatomy    Neck:  Salivary glands: Normal bilateral parotid and submandibular glands by inspection and palpation.  Non-thyroid masses: No palpable masses or significant lymphadenopathy  Trachea: Midline  Thyroid: No thyromegaly or palpable nodules  Temporomandibular joint: Nontender  Cervical range of motion: Normal    Neurologic exam: Alert and oriented x3, appropriate " affect.  Cranial nerves II-XII normal bilaterally  Extraocular movement: Extraocular movement intact, normal gaze alignment        Be was seen today for post-op.  Diagnoses and all orders for this visit:  Obstructive sleep apnea (Primary)  Deviated nasal septum  BMI 30.0-30.9,adult         PLAN  I have recommended proceeding with implantation of hypoglossal nerve stimulator (inspire) and chest wall respiratory sensor lead.  I discussed the surgery in great detail including the risks of bleeding infection pneumothorax hypoglossal nerve injury persistent apnea battery life MRI restrictions etc.  The patient understands the risks and benefits and would like to proceed with scheduling.    Jem Mueller MD

## 2024-03-28 ENCOUNTER — PRE-ADMISSION TESTING (OUTPATIENT)
Dept: PREADMISSION TESTING | Facility: HOSPITAL | Age: 39
End: 2024-03-28
Payer: COMMERCIAL

## 2024-03-28 VITALS
TEMPERATURE: 97.3 F | DIASTOLIC BLOOD PRESSURE: 94 MMHG | RESPIRATION RATE: 16 BRPM | HEART RATE: 92 BPM | WEIGHT: 198 LBS | BODY MASS INDEX: 31.82 KG/M2 | SYSTOLIC BLOOD PRESSURE: 124 MMHG | HEIGHT: 66 IN | OXYGEN SATURATION: 100 %

## 2024-03-28 DIAGNOSIS — Z01.818 PRE-OP TESTING: Primary | ICD-10-CM

## 2024-03-28 PROCEDURE — 87081 CULTURE SCREEN ONLY: CPT | Mod: TRILAB,WESLAB

## 2024-03-28 PROCEDURE — 99213 OFFICE O/P EST LOW 20 MIN: CPT | Performed by: PHYSICIAN ASSISTANT

## 2024-03-28 RX ORDER — CHLORHEXIDINE GLUCONATE ORAL RINSE 1.2 MG/ML
SOLUTION DENTAL
Qty: 473 ML | Refills: 0 | Status: SHIPPED | OUTPATIENT
Start: 2024-04-10 | End: 2024-04-11

## 2024-03-28 ASSESSMENT — DUKE ACTIVITY SCORE INDEX (DASI)
CAN YOU PARTICIPATE IN MODERATE RECREATIONAL ACTIVITIES LIKE GOLF, BOWLING, DANCING, DOUBLES TENNIS OR THROWING A BASEBALL OR FOOTBALL: YES
TOTAL_SCORE: 58.2
CAN YOU DO YARD WORK LIKE RAKING LEAVES, WEEDING OR PUSHING A MOWER: YES
CAN YOU WALK A BLOCK OR TWO ON LEVEL GROUND: YES
CAN YOU RUN A SHORT DISTANCE: YES
CAN YOU DO LIGHT WORK AROUND THE HOUSE LIKE DUSTING OR WASHING DISHES: YES
CAN YOU WALK INDOORS, SUCH AS AROUND YOUR HOUSE: YES
CAN YOU DO HEAVY WORK AROUND THE HOUSE LIKE SCRUBBING FLOORS OR LIFTING AND MOVING HEAVY FURNITURE: YES
CAN YOU TAKE CARE OF YOURSELF (EAT, DRESS, BATHE, OR USE TOILET): YES
CAN YOU PARTICIPATE IN STRENOUS SPORTS LIKE SWIMMING, SINGLES TENNIS, FOOTBALL, BASKETBALL, OR SKIING: YES
CAN YOU DO MODERATE WORK AROUND THE HOUSE LIKE VACUUMING, SWEEPING FLOORS OR CARRYING GROCERIES: YES
CAN YOU CLIMB A FLIGHT OF STAIRS OR WALK UP A HILL: YES
DASI METS SCORE: 9.9
CAN YOU HAVE SEXUAL RELATIONS: YES

## 2024-03-28 ASSESSMENT — ENCOUNTER SYMPTOMS
PSYCHIATRIC NEGATIVE: 1
RESPIRATORY NEGATIVE: 1
NEUROLOGICAL NEGATIVE: 1
EYES NEGATIVE: 1
MUSCULOSKELETAL NEGATIVE: 1
CARDIOVASCULAR NEGATIVE: 1
FATIGUE: 1
DIARRHEA: 1
ENDOCRINE NEGATIVE: 1

## 2024-03-28 ASSESSMENT — CHADS2 SCORE
HYPERTENSION: YES
DIABETES: NO
AGE GREATER THAN OR EQUAL TO 75: NO
CHF: NO
CHADS2 SCORE: 1
PRIOR STROKE OR TIA OR THROMBOEMBOLISM: NO

## 2024-03-28 ASSESSMENT — PAIN - FUNCTIONAL ASSESSMENT: PAIN_FUNCTIONAL_ASSESSMENT: 0-10

## 2024-03-28 ASSESSMENT — PAIN SCALES - GENERAL: PAINLEVEL_OUTOF10: 0 - NO PAIN

## 2024-03-28 NOTE — CPM/PAT H&P
"CPM/PAT Evaluation       Name: Be Ortega (Be Ortega)  /Age: 1985/38 y.o.     In-Person       Chief Complaint: \"sleep apnea\"    HPI  The patient is a 38 year old male.  He was diagnosed with obstructive sleep apnea in .  He has used CPAP, biPAP and dental appliances in the past which were helpful until 8-9 months ago.  He is unable to tolerate the CPAP mask and is removing the dental appliance during the night.  He was seen by Dr. Mueller and had a drug induced sleep endoscopy in  and his anatomy is favorable for the INSPIRE device.      Past Medical History:   Diagnosis Date    Angina pectoris (CMS/HCC)     Asthma     Cutaneous abscess of buttock 2016    Abscess of buttock    GERD (gastroesophageal reflux disease)     Hypercholesteremia     Hypertension     Myocardial infarction (CMS/HCC)     Other chest pain 2020    Atypical chest pain    Other muscle spasm 2015    Trapezius muscle spasm    Other specified diseases of anus and rectum 2016    Rectal pain    Sleep apnea        Past Surgical History:   Procedure Laterality Date    CARDIAC CATHETERIZATION      Non-critical CAD    CARDIOVASCULAR STRESS TEST      2023    OTHER SURGICAL HISTORY      Drug induced sleep endoscopy    WISDOM TOOTH EXTRACTION       Family History   Problem Relation Name Age of Onset    Hypertension Father      Sleep apnea Father      Coronary artery disease Brother      Breast cancer Maternal Grandmother      Other (mesothelioma) Maternal Grandfather      Pancreatic cancer Paternal Grandfather       Social History     Tobacco Use    Smoking status: Former     Years: 8     Types: Cigarettes     Quit date:      Years since quittin.2    Smokeless tobacco: Former     Types: Chew     Quit date:     Tobacco comments:     NICOTINE POUCH CALLED ZYN. TAKES OCCASIONAL   Substance Use Topics    Alcohol use: Never     Social History     Substance and Sexual " "Activity   Drug Use Never       No Known Allergies    Current Outpatient Medications   Medication Sig Dispense Refill    amLODIPine (Norvasc) 5 mg tablet Take 1 tablet (5 mg) by mouth once daily. 30 tablet 11    [START ON 4/10/2024] chlorhexidine (Peridex) 0.12 % solution Use as directed - patient may  prescription prior to 4/10/2024. Do not start before April 10, 2024. 473 mL 0    nitroglycerin (Nitrostat) 0.4 mg SL tablet Place 1 tablet (0.4 mg) under the tongue every 5 minutes if needed for chest pain (ANGINA).      omeprazole (PriLOSEC) 40 mg DR capsule Take 1 capsule (40 mg) by mouth once daily. Do not crush or chew. 90 capsule 3     No current facility-administered medications for this visit.     Review of Systems   Constitutional:  Positive for fatigue.   HENT: Negative.     Eyes: Negative.    Respiratory: Negative.     Cardiovascular: Negative.    Gastrointestinal:  Positive for diarrhea.   Endocrine: Negative.    Genitourinary: Negative.    Musculoskeletal: Negative.    Neurological: Negative.    Psychiatric/Behavioral: Negative.       BP (!) 124/94   Pulse 92   Temp 36.3 °C (97.3 °F) (Temporal)   Resp 16   Ht 1.676 m (5' 6\")   Wt 89.8 kg (198 lb)   SpO2 100%   BMI 31.96 kg/m²     Physical Exam  Vitals reviewed.   Constitutional:       Appearance: Normal appearance.   HENT:      Head: Normocephalic and atraumatic.      Mouth/Throat:      Mouth: Mucous membranes are moist.      Pharynx: Oropharynx is clear.   Eyes:      Extraocular Movements: Extraocular movements intact.      Pupils: Pupils are equal, round, and reactive to light.   Cardiovascular:      Rate and Rhythm: Normal rate and regular rhythm.      Heart sounds: Normal heart sounds.   Pulmonary:      Breath sounds: Normal breath sounds.   Abdominal:      General: Bowel sounds are normal.      Palpations: Abdomen is soft.   Musculoskeletal:         General: No swelling.   Skin:     General: Skin is warm and dry.   Neurological:      " General: No focal deficit present.      Mental Status: He is alert.   Psychiatric:         Mood and Affect: Mood normal.         Behavior: Behavior normal.        PAT AIRWAY:   Airway:     Mallampati::  II    TM distance::  >3 FB    Neck ROM::  Full   Teeth intact    ASA:  II  DASI RISK SCORE:  58.2  METS SCORE:  9.9  CHAD2 SCORE:  2.8%  REVISED CARDIAC RISK INDEX:  0.9%  STOP BANG SCORE:  6    EKG 11/23/2023   CBC, CMP 12/1/2023    Assessment and Plan:     Obstructive sleep apnea:  INSPIRE procedure  H/O NSTEMI 2020 - heart catheterization 2020 - non-critical CAD - medical management only - no recent nitroglycerin use - normal stress test, echo 11/2023 - followed by Dr. Max Reed (Cardiology)  Essential hypertension - currently taking Norvasc    Cecille Arizmendi PA-C

## 2024-03-28 NOTE — PREPROCEDURE INSTRUCTIONS
Why must I stop eating and drinking near surgery time?  With sedation, food or liquid in your stomach can enter your lungs causing serious complications  Increases nausea and vomiting    When do I need to stop eating and drinking before my surgery?   Do not eat or drink after midnight the night before your surgery/procedure.  You may have small sips of water to take your medication.      PAT DISCHARGE INSTRUCTIONS    Please call the Same Day Surgery (SDS) Department of the hospital where your procedure will be performed after 2:00 PM the day before your surgery. If you are scheduled on a Monday, or a Tuesday following a Monday holiday, you will need to call on the last business day prior to your surgery.    [place] [id number] [place] [id number] [phone]    Please let your surgeon know if:      You develop any open sores, shingles, burning or painful urination as these may increase your risk of an infection.   You no longer wish to have the surgery.   Any other personal circumstances change that may lead to the need to cancel or defer this surgery-such as being sick or getting admitted to any hospital within one week of your planned procedure.    Your contact details change, such as a change of address or phone number.    Starting now:     Please DO NOT drink alcohol or smoke for 24 hours before surgery. It is well known that quitting smoking can make a huge difference to your health and recovery from surgery. The longer you abstain from smoking, the better your chances of a healthy recovery. If you need help with quitting, call 4-445-QUIT-NOW to be connected to a trained counselor who will discuss the best methods to help you quit.     Before your surgery:    Please stop all supplements 7 days prior to surgery. Or as directed by your surgeon.   Please stop taking NSAID pain medicine such as Advil and Motrin 7 days before surgery.    If you develop any fever,  cough, cold, rashes, cuts, scratches, scrapes, urinary symptoms or infection anywhere on your body (including teeth and gums) prior to surgery, please call your surgeon’s office as soon as possible. This may require treatment to reduce the chance of cancellation on the day of surgery.    The day before your surgery:   DIET- Please follow the diet instructions at the top of your packet.   Get a good night’s rest.  Use the special soap for bathing if you have been instructed to use one.    Scheduled surgery times may change and you will be notified if this occurs - please check your personal voicemail for any updates.     On the morning of surgery:   Wear comfortable, loose fitting clothes which open in the front. Please do not wear moisturizers, creams, lotions, makeup or perfume.    Please bring with you to surgery:   Photo ID and insurance card   Current list of medicines and allergies   Pacemaker/ Defibrillator/Heart stent cards   CPAP machine and mask    Slings/ splints/ crutches   A copy of your complete advanced directive/DHPOA.    Please do NOT bring with you to surgery:   All jewelry and valuables should be left at home.   Prosthetic devices such as contact lenses, hearing aids, dentures, eyelash extensions, hairpins and body piercings must be removed prior to going in to the surgical suite.    After outpatient surgery:   A responsible adult MUST accompany you at the time of discharge and stay with you for 24 hours after your surgery. You may NOT drive yourself home after surgery.    Do not drive, operate machinery, make critical decisions or do activities that require co-ordination or balance until after a night’s sleep.   Do not drink alcoholic beverages for 24 hours.   Instructions for resuming your medications will be provided by your surgeon.    CALL YOUR DOCTOR AFTER SURGERY IF YOU HAVE:     Chills and/or a fever of 101° F or higher.    Redness, swelling, pus or drainage from your surgical wound or a  bad smell from the wound.    Lightheadedness, fainting or confusion.    Persistent vomiting (throwing up) and are not able to eat or drink for 12 hours.    Three or more loose, watery bowel movements in 24 hours (diarrhea).   Difficulty or pain while urinating( after non-urological surgery)    Pain and swelling in your legs, especially if it is only on one side.    Difficulty breathing or are breathing faster than normal.    Any new concerning symptoms.        Patient Information: Pre-Operative Infection Prevention Measures     Why did I have my nose, under my arms, and groin swabbed?  The purpose of the swab is to identify Staphylococcus aureus inside your nose or on your skin.  The swab was sent to the laboratory for culture.  A positive swab/culture for Staphylococcus aureus is called colonization or carriage.      What is Staphylococcus aureus?  Staphylococcus aureus, also known as “staph”, is a germ found on the skin or in the nose of healthy people.  Sometimes Staphylococcus aureus can get into the body and cause an infection.  This can be minor (such as pimples, boils, or other skin problems).  It might also be serious (such as a blood infection, pneumonia, or a surgical site infection).    What is Staphylococcus aureus colonization or carriage?  Colonization or carriage means that a person has the germ but is not sick from it.  These bacteria can be spread on the hands or when breathing or sneezing.    How is Staphylococcus aureus spread?  It is most often spread by close contact with a person or item that carries it.    What happens if my culture is positive for Staphylococcus aureus?  Your doctor/medical team will use this information to guide any antibiotic treatment which may be necessary.  Regardless of the culture results, we will clean the inside of your nose with a betadine swab just before you have your surgery.      Will I get an infection if I have Staphylococcus aureus in my nose or on my  skin?  Anyone can get an infection with Staphylococcus aureus.  However, the best way to reduce your risk of infection is to follow the instructions provided to you for the use of your CHG soap and dental rinse.        Patient Information: Oral/Dental Rinse    What is oral/dental rinse?   It is a mouthwash. It is a way of cleaning the mouth with a germ-killing solution before your surgery.  The solution contains chlorhexidine, commonly known as CHG.   It is used inside the mouth to kill a bacteria known as Staphylococcus aureus.  Let your doctor know if you are allergic to Chlorhexidine.    Why do I need to use CHG oral/dental rinse?  The CHG oral/dental rinse helps to kill a bacteria in your mouth known as Staphylococcus aureus.     This reduces the risk of infection at the surgical site.      Using your CHG oral/dental rinse  STEPS:  Use your CHG oral/dental rinse after you brush your teeth the night before (at bedtime) and the morning of your surgery.  Follow all directions on your prescription label.    Use the cap on the container to measure 15ml   Swish (gargle if you can) the mouthwash in your mouth for at least 30 seconds, (do not swallow) and spit out  After you use your CHG rinse, do not rinse your mouth with water, drink or eat.  Please refer to the prescription label for the appropriate time to resume oral intake      What side effects might I have using the CHG oral/dental rinse?  CHG rinse will stick to plaque on the teeth.  Brush and floss just before use.  Teeth brushing will help avoid staining of plaque during use.      Patient Information: Home Preoperative Antibacterial Shower      What is a home preoperative antibacterial shower?  This shower is a way of cleaning the skin with a germ-killing solution before surgery.  The solution contains chlorhexidine, commonly known as CHG.  CHG is a skin cleanser with germ-killing ability.  Let your doctor know if you are allergic to chlorhexidine.    Why do  I need to take a preoperative antibacterial shower?  Skin is not sterile.  It is best to try to make your skin as free of germs as possible before surgery.  Proper cleansing with a germ-killing soap before surgery can lower the number of germs on your skin.  This helps to reduce the risk of infection at the surgical site.  Following the instructions listed below will help you prepare your skin for surgery.      How do I use the solution?  Steps:  Begin using your CHG soap 5 days before your scheduled surgery on ________________________.    First, wash and rinse your hair using the CHG soap. Keep CHG soap away from ear canals and eyes.  Rinse completely, do not condition.  Hair extensions should be removed.  Wash your face with your normal soap and rinse.    Apply the CHG solution to a clean wet washcloth.  Turn the water off or move away from the water spray to avoid premature rinsing of the CHG soap as you are applying.   Firmly lather your entire body from the neck down.  Do not use on your face.  Pay special attention to the area(s) where your incision(s) will be located unless they are on your face.  Avoid scrubbing your skin too hard.  The important point is to have the CHG soap sit on your skin for 3 minutes.    When the 3 minutes are up, turn on the water and rinse the CHG solution off your body completely.   DO NOT wash with regular soap after you have used the CHG soap solution  Pat yourself dry with a clean, freshly-laundered towel.  DO NOT apply powders, deodorants, or lotions.  Dress in clean, freshly laundered nightclothes.    Be sure to sleep with clean, freshly laundered sheets.  Be aware that CHG will cause stains on fabrics; if you wash them with bleach after use.  Rinse your washcloth and other linens that have contact with CHG completely.  Use only non-chlorine detergents to launder the items used.   The morning of surgery is the fifth day.  Repeat the above steps and dress in clean comfortable  clothing     Whom should I contact if I have any questions regarding the use of CHG soap?  Call the [facility] at [phone] if you have any questions.              Medication List            Accurate as of March 28, 2024 12:26 PM. Always use your most recent med list.                amLODIPine 5 mg tablet  Commonly known as: Norvasc  Take 1 tablet (5 mg) by mouth once daily.  Medication Adjustments for Surgery: Take morning of surgery with sip of water, no other fluids     nitroglycerin 0.4 mg SL tablet  Commonly known as: Nitrostat  Notes to patient: Take as needed.     omeprazole 40 mg DR capsule  Commonly known as: PriLOSEC  Take 1 capsule (40 mg) by mouth once daily. Do not crush or chew.  Medication Adjustments for Surgery: Take morning of surgery with sip of water, no other fluids

## 2024-03-30 LAB — STAPHYLOCOCCUS SPEC CULT: NORMAL

## 2024-04-10 ENCOUNTER — ANESTHESIA EVENT (OUTPATIENT)
Dept: OPERATING ROOM | Facility: HOSPITAL | Age: 39
End: 2024-04-10
Payer: COMMERCIAL

## 2024-04-10 SDOH — HEALTH STABILITY: MENTAL HEALTH: CURRENT SMOKER: 0

## 2024-04-10 NOTE — ANESTHESIA PREPROCEDURE EVALUATION
Patient: Be Ortega    Procedure Information       Date/Time: 04/11/24 0730    Procedure: Inspire procedure (Right) - Inspire    [facility] / [facility]    Surgeons: Jem Mueller MD            Relevant Problems   Anesthesia   (+) Left corneal abrasion      Cardiac   (+) Chest pain   (+) Essential hypertension   (+) Hypercholesterolemia   (+) Non-ST elevation myocardial infarction (NSTEMI) in recovery phase (CMS/HCC)      Pulmonary   (+) NELY (obstructive sleep apnea)   (+) Obstructive sleep apnea   (+) SOB (shortness of breath) on exertion      GI   (+) GERD (gastroesophageal reflux disease)      Endocrine   (+) Class 1 obesity with body mass index (BMI) of 32.0 to 32.9 in adult     Past Surgical History:   Procedure Laterality Date    CARDIAC CATHETERIZATION  2020    Non-critical CAD    CARDIOVASCULAR STRESS TEST      12/2023    OTHER SURGICAL HISTORY  2024    Drug induced sleep endoscopy    WISDOM TOOTH EXTRACTION           Clinical information reviewed:                   NPO Detail:  No data recorded     Physical Exam    Airway  Mallampati: II  TM distance: >3 FB  Neck ROM: full     Cardiovascular   Comments: deferred   Dental    Pulmonary   Comments: deferred   Abdominal     Comments: deferred           Anesthesia Plan    History of general anesthesia?: yes  History of complications of general anesthesia?: no    ASA 2     general     The patient is not a current smoker.    intravenous induction   Postoperative administration of opioids is intended.  Anesthetic plan and risks discussed with patient.    Plan discussed with CAA.

## 2024-04-11 ENCOUNTER — ANESTHESIA (OUTPATIENT)
Dept: OPERATING ROOM | Facility: HOSPITAL | Age: 39
End: 2024-04-11
Payer: COMMERCIAL

## 2024-04-11 ENCOUNTER — HOSPITAL ENCOUNTER (OUTPATIENT)
Facility: HOSPITAL | Age: 39
Setting detail: OUTPATIENT SURGERY
Discharge: HOME | End: 2024-04-11
Attending: OTOLARYNGOLOGY | Admitting: OTOLARYNGOLOGY
Payer: COMMERCIAL

## 2024-04-11 ENCOUNTER — APPOINTMENT (OUTPATIENT)
Dept: RADIOLOGY | Facility: HOSPITAL | Age: 39
End: 2024-04-11
Payer: COMMERCIAL

## 2024-04-11 ENCOUNTER — PHARMACY VISIT (OUTPATIENT)
Dept: PHARMACY | Facility: CLINIC | Age: 39
End: 2024-04-11
Payer: MEDICARE

## 2024-04-11 VITALS
OXYGEN SATURATION: 96 % | SYSTOLIC BLOOD PRESSURE: 114 MMHG | WEIGHT: 197.97 LBS | DIASTOLIC BLOOD PRESSURE: 72 MMHG | BODY MASS INDEX: 31.82 KG/M2 | HEIGHT: 66 IN | HEART RATE: 88 BPM | RESPIRATION RATE: 16 BRPM | TEMPERATURE: 97.2 F

## 2024-04-11 DIAGNOSIS — G89.18 POST-OPERATIVE PAIN: ICD-10-CM

## 2024-04-11 DIAGNOSIS — G47.33 OBSTRUCTIVE SLEEP APNEA: Primary | ICD-10-CM

## 2024-04-11 PROBLEM — S09.93XA DENTAL TRAUMA: Status: ACTIVE | Noted: 2024-04-11

## 2024-04-11 PROCEDURE — C1767 GENERATOR, NEURO NON-RECHARG: HCPCS | Performed by: OTOLARYNGOLOGY

## 2024-04-11 PROCEDURE — 2500000005 HC RX 250 GENERAL PHARMACY W/O HCPCS: Performed by: OTOLARYNGOLOGY

## 2024-04-11 PROCEDURE — 7100000002 HC RECOVERY ROOM TIME - EACH INCREMENTAL 1 MINUTE: Performed by: OTOLARYNGOLOGY

## 2024-04-11 PROCEDURE — 7100000001 HC RECOVERY ROOM TIME - INITIAL BASE CHARGE: Performed by: OTOLARYNGOLOGY

## 2024-04-11 PROCEDURE — 3600000004 HC OR TIME - INITIAL BASE CHARGE - PROCEDURE LEVEL FOUR: Performed by: OTOLARYNGOLOGY

## 2024-04-11 PROCEDURE — 3700000002 HC GENERAL ANESTHESIA TIME - EACH INCREMENTAL 1 MINUTE: Performed by: OTOLARYNGOLOGY

## 2024-04-11 PROCEDURE — A64582 PR OPEN IMPLTJ HPGLSL NRV NSTIM RA PG AND RESPIR SENSOR: Performed by: ANESTHESIOLOGIST ASSISTANT

## 2024-04-11 PROCEDURE — 71045 X-RAY EXAM CHEST 1 VIEW: CPT

## 2024-04-11 PROCEDURE — 2500000004 HC RX 250 GENERAL PHARMACY W/ HCPCS (ALT 636 FOR OP/ED): Performed by: OTOLARYNGOLOGY

## 2024-04-11 PROCEDURE — 3600000009 HC OR TIME - EACH INCREMENTAL 1 MINUTE - PROCEDURE LEVEL FOUR: Performed by: OTOLARYNGOLOGY

## 2024-04-11 PROCEDURE — C1889 IMPLANT/INSERT DEVICE, NOC: HCPCS | Performed by: OTOLARYNGOLOGY

## 2024-04-11 PROCEDURE — 7100000010 HC PHASE TWO TIME - EACH INCREMENTAL 1 MINUTE: Performed by: OTOLARYNGOLOGY

## 2024-04-11 PROCEDURE — 71045 X-RAY EXAM CHEST 1 VIEW: CPT | Performed by: RADIOLOGY

## 2024-04-11 PROCEDURE — 2500000004 HC RX 250 GENERAL PHARMACY W/ HCPCS (ALT 636 FOR OP/ED): Performed by: ANESTHESIOLOGIST ASSISTANT

## 2024-04-11 PROCEDURE — A64582 PR OPEN IMPLTJ HPGLSL NRV NSTIM RA PG AND RESPIR SENSOR: Performed by: ANESTHESIOLOGY

## 2024-04-11 PROCEDURE — 64582 OPN MPLTJ HPGLSL NSTM ARY PG: CPT | Performed by: OTOLARYNGOLOGY

## 2024-04-11 PROCEDURE — 2500000004 HC RX 250 GENERAL PHARMACY W/ HCPCS (ALT 636 FOR OP/ED): Performed by: ANESTHESIOLOGY

## 2024-04-11 PROCEDURE — RXMED WILLOW AMBULATORY MEDICATION CHARGE

## 2024-04-11 PROCEDURE — 2720000007 HC OR 272 NO HCPCS: Performed by: OTOLARYNGOLOGY

## 2024-04-11 PROCEDURE — 7100000009 HC PHASE TWO TIME - INITIAL BASE CHARGE: Performed by: OTOLARYNGOLOGY

## 2024-04-11 PROCEDURE — A4649 SURGICAL SUPPLIES: HCPCS | Performed by: OTOLARYNGOLOGY

## 2024-04-11 PROCEDURE — 2500000001 HC RX 250 WO HCPCS SELF ADMINISTERED DRUGS (ALT 637 FOR MEDICARE OP): Performed by: OTOLARYNGOLOGY

## 2024-04-11 PROCEDURE — 70360 X-RAY EXAM OF NECK: CPT | Performed by: RADIOLOGY

## 2024-04-11 PROCEDURE — 2500000005 HC RX 250 GENERAL PHARMACY W/O HCPCS: Performed by: ANESTHESIOLOGIST ASSISTANT

## 2024-04-11 PROCEDURE — 2780000003 HC OR 278 NO HCPCS: Performed by: OTOLARYNGOLOGY

## 2024-04-11 PROCEDURE — 70360 X-RAY EXAM OF NECK: CPT

## 2024-04-11 PROCEDURE — C1778 LEAD, NEUROSTIMULATOR: HCPCS | Performed by: OTOLARYNGOLOGY

## 2024-04-11 PROCEDURE — 3700000001 HC GENERAL ANESTHESIA TIME - INITIAL BASE CHARGE: Performed by: OTOLARYNGOLOGY

## 2024-04-11 DEVICE — LEAD, STIMULATOR INSPIRE: Type: IMPLANTABLE DEVICE | Site: NECK | Status: FUNCTIONAL

## 2024-04-11 DEVICE — STIMULATOR, UPPER AIRWAY INSPIRE: Type: IMPLANTABLE DEVICE | Site: CHEST  WALL | Status: FUNCTIONAL

## 2024-04-11 DEVICE — LEAD, SENSING INSPIRE, 4340: Type: IMPLANTABLE DEVICE | Site: CHEST  WALL | Status: FUNCTIONAL

## 2024-04-11 RX ORDER — FENTANYL CITRATE 50 UG/ML
50 INJECTION, SOLUTION INTRAMUSCULAR; INTRAVENOUS EVERY 5 MIN PRN
Status: DISCONTINUED | OUTPATIENT
Start: 2024-04-11 | End: 2024-04-11 | Stop reason: HOSPADM

## 2024-04-11 RX ORDER — LIDOCAINE HYDROCHLORIDE AND EPINEPHRINE 10; 10 MG/ML; UG/ML
INJECTION, SOLUTION INFILTRATION; PERINEURAL AS NEEDED
Status: DISCONTINUED | OUTPATIENT
Start: 2024-04-11 | End: 2024-04-11 | Stop reason: HOSPADM

## 2024-04-11 RX ORDER — ALBUTEROL SULFATE 0.83 MG/ML
2.5 SOLUTION RESPIRATORY (INHALATION) ONCE AS NEEDED
Status: DISCONTINUED | OUTPATIENT
Start: 2024-04-11 | End: 2024-04-11 | Stop reason: HOSPADM

## 2024-04-11 RX ORDER — HYDROCODONE BITARTRATE AND ACETAMINOPHEN 5; 325 MG/1; MG/1
1 TABLET ORAL EVERY 6 HOURS PRN
Status: DISCONTINUED | OUTPATIENT
Start: 2024-04-11 | End: 2024-04-11 | Stop reason: HOSPADM

## 2024-04-11 RX ORDER — ONDANSETRON HYDROCHLORIDE 2 MG/ML
4 INJECTION, SOLUTION INTRAVENOUS ONCE AS NEEDED
Status: DISCONTINUED | OUTPATIENT
Start: 2024-04-11 | End: 2024-04-11 | Stop reason: HOSPADM

## 2024-04-11 RX ORDER — SODIUM CHLORIDE, SODIUM LACTATE, POTASSIUM CHLORIDE, CALCIUM CHLORIDE 600; 310; 30; 20 MG/100ML; MG/100ML; MG/100ML; MG/100ML
100 INJECTION, SOLUTION INTRAVENOUS CONTINUOUS
Status: DISCONTINUED | OUTPATIENT
Start: 2024-04-11 | End: 2024-04-11 | Stop reason: HOSPADM

## 2024-04-11 RX ORDER — ACETAMINOPHEN 325 MG/1
650 TABLET ORAL EVERY 6 HOURS PRN
Status: DISCONTINUED | OUTPATIENT
Start: 2024-04-11 | End: 2024-04-11 | Stop reason: HOSPADM

## 2024-04-11 RX ORDER — MIDAZOLAM HYDROCHLORIDE 1 MG/ML
INJECTION, SOLUTION INTRAMUSCULAR; INTRAVENOUS AS NEEDED
Status: DISCONTINUED | OUTPATIENT
Start: 2024-04-11 | End: 2024-04-11

## 2024-04-11 RX ORDER — PROPOFOL 10 MG/ML
INJECTION, EMULSION INTRAVENOUS AS NEEDED
Status: DISCONTINUED | OUTPATIENT
Start: 2024-04-11 | End: 2024-04-11

## 2024-04-11 RX ORDER — ROCURONIUM BROMIDE 10 MG/ML
INJECTION, SOLUTION INTRAVENOUS AS NEEDED
Status: DISCONTINUED | OUTPATIENT
Start: 2024-04-11 | End: 2024-04-11

## 2024-04-11 RX ORDER — HYDROCODONE BITARTRATE AND ACETAMINOPHEN 5; 325 MG/1; MG/1
1 TABLET ORAL EVERY 6 HOURS PRN
Qty: 15 TABLET | Refills: 0 | Status: SHIPPED | OUTPATIENT
Start: 2024-04-11 | End: 2024-04-18

## 2024-04-11 RX ORDER — LIDOCAINE HYDROCHLORIDE 10 MG/ML
INJECTION, SOLUTION INTRAVENOUS AS NEEDED
Status: DISCONTINUED | OUTPATIENT
Start: 2024-04-11 | End: 2024-04-11

## 2024-04-11 RX ORDER — ONDANSETRON 4 MG/1
4 TABLET, ORALLY DISINTEGRATING ORAL EVERY 6 HOURS
Status: DISCONTINUED | OUTPATIENT
Start: 2024-04-11 | End: 2024-04-11 | Stop reason: HOSPADM

## 2024-04-11 RX ORDER — MEPERIDINE HYDROCHLORIDE 25 MG/ML
12.5 INJECTION INTRAMUSCULAR; INTRAVENOUS; SUBCUTANEOUS EVERY 10 MIN PRN
Status: DISCONTINUED | OUTPATIENT
Start: 2024-04-11 | End: 2024-04-11 | Stop reason: HOSPADM

## 2024-04-11 RX ORDER — CEFAZOLIN SODIUM 2 G/100ML
2 INJECTION, SOLUTION INTRAVENOUS ONCE
Status: COMPLETED | OUTPATIENT
Start: 2024-04-11 | End: 2024-04-11

## 2024-04-11 RX ORDER — FENTANYL CITRATE 50 UG/ML
INJECTION, SOLUTION INTRAMUSCULAR; INTRAVENOUS AS NEEDED
Status: DISCONTINUED | OUTPATIENT
Start: 2024-04-11 | End: 2024-04-11

## 2024-04-11 RX ORDER — ONDANSETRON HYDROCHLORIDE 2 MG/ML
INJECTION, SOLUTION INTRAVENOUS AS NEEDED
Status: DISCONTINUED | OUTPATIENT
Start: 2024-04-11 | End: 2024-04-11

## 2024-04-11 RX ORDER — SUCCINYLCHOLINE CHLORIDE 20 MG/ML
INJECTION INTRAMUSCULAR; INTRAVENOUS AS NEEDED
Status: DISCONTINUED | OUTPATIENT
Start: 2024-04-11 | End: 2024-04-11

## 2024-04-11 RX ADMIN — REMIFENTANIL HYDROCHLORIDE 0.08 MCG/KG/MIN: 1 INJECTION, POWDER, LYOPHILIZED, FOR SOLUTION INTRAVENOUS at 08:03

## 2024-04-11 RX ADMIN — ONDANSETRON HYDROCHLORIDE 4 MG: 2 INJECTION INTRAMUSCULAR; INTRAVENOUS at 09:22

## 2024-04-11 RX ADMIN — FENTANYL CITRATE 25 MCG: 0.05 INJECTION, SOLUTION INTRAMUSCULAR; INTRAVENOUS at 09:22

## 2024-04-11 RX ADMIN — FENTANYL CITRATE 50 MCG: 0.05 INJECTION, SOLUTION INTRAMUSCULAR; INTRAVENOUS at 07:59

## 2024-04-11 RX ADMIN — MIDAZOLAM HYDROCHLORIDE 2 MG: 1 INJECTION, SOLUTION INTRAMUSCULAR; INTRAVENOUS at 07:54

## 2024-04-11 RX ADMIN — DEXAMETHASONE SODIUM PHOSPHATE 8 MG: 4 INJECTION, SOLUTION INTRAMUSCULAR; INTRAVENOUS at 08:05

## 2024-04-11 RX ADMIN — HYDROCODONE BITARTRATE AND ACETAMINOPHEN 1 TABLET: 5; 325 TABLET ORAL at 12:18

## 2024-04-11 RX ADMIN — HYDROMORPHONE HYDROCHLORIDE 0.5 MG: 1 INJECTION, SOLUTION INTRAMUSCULAR; INTRAVENOUS; SUBCUTANEOUS at 10:17

## 2024-04-11 RX ADMIN — SUCCINYLCHOLINE CHLORIDE 160 MG: 20 INJECTION, SOLUTION INTRAMUSCULAR; INTRAVENOUS at 07:59

## 2024-04-11 RX ADMIN — CEFAZOLIN SODIUM 2 G: 2 INJECTION, SOLUTION INTRAVENOUS at 07:54

## 2024-04-11 RX ADMIN — HYDROMORPHONE HYDROCHLORIDE 0.5 MG: 1 INJECTION, SOLUTION INTRAMUSCULAR; INTRAVENOUS; SUBCUTANEOUS at 10:35

## 2024-04-11 RX ADMIN — PROPOFOL 200 MG: 10 INJECTION, EMULSION INTRAVENOUS at 07:59

## 2024-04-11 RX ADMIN — SODIUM CHLORIDE, SODIUM LACTATE, POTASSIUM CHLORIDE, AND CALCIUM CHLORIDE: 600; 310; 30; 20 INJECTION, SOLUTION INTRAVENOUS at 08:36

## 2024-04-11 RX ADMIN — ROCURONIUM BROMIDE 10 MG: 10 INJECTION, SOLUTION INTRAVENOUS at 07:59

## 2024-04-11 RX ADMIN — FENTANYL CITRATE 25 MCG: 0.05 INJECTION, SOLUTION INTRAMUSCULAR; INTRAVENOUS at 08:45

## 2024-04-11 RX ADMIN — SODIUM CHLORIDE, SODIUM LACTATE, POTASSIUM CHLORIDE, AND CALCIUM CHLORIDE 100 ML/HR: 600; 310; 30; 20 INJECTION, SOLUTION INTRAVENOUS at 06:55

## 2024-04-11 RX ADMIN — LIDOCAINE HYDROCHLORIDE 30 MG: 10 INJECTION, SOLUTION INTRAVENOUS at 07:59

## 2024-04-11 ASSESSMENT — PAIN SCALES - GENERAL
PAINLEVEL_OUTOF10: 6
PAINLEVEL_OUTOF10: 3
PAINLEVEL_OUTOF10: 3
PAINLEVEL_OUTOF10: 6
PAINLEVEL_OUTOF10: 3
PAINLEVEL_OUTOF10: 0 - NO PAIN
PAIN_LEVEL: 3
PAINLEVEL_OUTOF10: 0 - NO PAIN
PAINLEVEL_OUTOF10: 3
PAINLEVEL_OUTOF10: 3
PAINLEVEL_OUTOF10: 6

## 2024-04-11 ASSESSMENT — PAIN - FUNCTIONAL ASSESSMENT
PAIN_FUNCTIONAL_ASSESSMENT: 0-10

## 2024-04-11 ASSESSMENT — PAIN DESCRIPTION - LOCATION
LOCATION: THROAT
LOCATION: THROAT
LOCATION: NECK

## 2024-04-11 ASSESSMENT — PAIN DESCRIPTION - DESCRIPTORS
DESCRIPTORS: ACHING
DESCRIPTORS: SORE
DESCRIPTORS: THROBBING
DESCRIPTORS: ACHING

## 2024-04-11 NOTE — ANESTHESIA PROCEDURE NOTES
Airway  Date/Time: 4/11/2024 8:01 AM  Urgency: elective    Airway not difficult    Staffing  Performed: JONNA   Authorized by: Diego Girard MD    Performed by: JONNA Latif  Patient location during procedure: OR    Indications and Patient Condition  Indications for airway management: anesthesia and airway protection  Spontaneous ventilation: present  Sedation level: deep  Preoxygenated: yes  Patient position: sniffing  Mask difficulty assessment: 1 - vent by mask  Planned trial extubation    Final Airway Details  Final airway type: endotracheal airway      Successful airway: ETT  Cuffed: yes   Successful intubation technique: direct laryngoscopy  Facilitating devices/methods: intubating stylet  Endotracheal tube insertion site: oral  Blade: Chris  Blade size: #3  ETT size (mm): 7.5  Cormack-Lehane Classification: grade I - full view of glottis  Placement verified by: chest auscultation and capnometry   Cuff volume (mL): 8  Measured from: lips  ETT to lips (cm): 23  Number of attempts at approach: 1    Additional Comments  EASY ETT PLACEMENT  ATRAUMATIC

## 2024-04-11 NOTE — OP NOTE
Date: 2024  OR Location: [facility]    Name: Be Ortega : 1985 Age: 38 y.o. MRN: [id number]  Sex: male      Diagnosis  Pre-op Diagnosis    Pre-Op Diagnosis Codes:     * Obstructive sleep apnea [G47.33]     * BMI 31.0-31.9,adult [Z68.31] Post-op Diagnosis     Pre-Op Diagnosis Codes:     * Obstructive sleep apnea [G47.33]     * BMI 31.0-31.9,adult [Z68.31]   Body mass index is 31.97 kg/m².     Procedures    Inspire procedure  59818 - MD OPEN IMPLTJ HPGLSL NRV NSTIM RA PG&RESPIR SENSOR       Surgeons      Jem Mueller MD     Assistant:  See OR log    Procedure Summary  Anesthesia: General  Anesthesia Staff: Diego Girard MD   Estimated Blood Loss: minimal      Specimen:   none    Implants: Inspire stimulator    Findings: Excellent tongue protrusion and excellent respiratory waveform    Indications: Be Ortega 38 y.o. male who is having surgery for Obstructive sleep apnea [G47.33].  He has obstructive sleep apnea and is unable to tolerate CPAP    The patient was seen in the preoperative area. The risks, benefits, complications, treatment options, non-operative alternatives, expected recovery and outcomes were discussed with the patient. The possibilities of reaction to medication, pulmonary aspiration, injury to surrounding structures, bleeding, recurrent infection, the need for additional procedures, failure to diagnose a condition, and creating a complication requiring transfusion or operation were discussed with the patient. The patient concurred with the proposed plan, giving informed consent.  The site of surgery was properly noted/marked if necessary per policy. The patient has been actively warmed in preoperative area. Preoperative antibiotics have been ordered and given within 1 hours of incision. Venous thrombosis prophylaxis have been ordered including bilateral sequential compression devices    Procedure Details: The patient was brought to the operating room placed on the  operating table in the supine position. After blood pressure and cardiac monitors were applied the patient was placed under general anesthesia and orally intubated. Electrode sense was replaced into the genioglossus and hyoglossus and attached to the nerve monitor. Incisions were mapped out in the submandibular and chest wall appropriate areas. These were infiltrated with 1% lidocaine with 1 100,000 epinephrine. The patient was then prepped and draped in the usual sterile fashion. The submandibular incision was made down through skin subcutaneous tissue and platysma. The digastric tendon was identified and retracted inferiorly with 2 stay sutures. The mylohyoid was retracted anteriorly and the submandibular gland was retracted posteriorly. This exposed the hypoglossal nerve. The vena comitans was dissected from the nerve and reflected inferiorly. The nerve was followed anteriorly to the anatomic break point. The inclusive branches including genioglossus transverse cervical and C1 were  from the exclusionary branches of the hyloglossis and styloidglossis. After confirming this division with the nerve monitor the inspire stimulation cuff was placed around the inclusionary branches. The anchoring device was sutured to the digastric membrane in the cuff was filled with saline.  Attention was then directed to the chest wall. An incision was made overlying the rib space between the 2nd and 3rd ribs. Incision was made down through skin and subcutaneous fat. A pocket was created superficial to the pectoralis fascia. Retention sutures were sewn to the fascia. The pectoralis fascia was then incised. The pectoralis muscle was spread over the space between the 2nd and 3rd rib. The external intercostal is a were identified. A perforation was made in the external intercostal is a and the respiratory sensor lead was then passed deep to the external intercostal is a and superficial to the internal intercostal muscles. 2  anchoring devices were sutured to the external intercostal is a and then to the pectoralis fascia.  The stimulation lead was then passed in a sub platysmal plane from the submandibular incision down into the pectoralis incision. Both leads were attached to the generator. The generator was then investigated with stimulation.   There was not good tongue protrusion. The cuff was investigated and the nerve in the cuff was not giving good stimulation. Stimulation anterior to where the cuff was placed did stimulate well. There was no visible injury to the nerve. The nerve was deemed paretic. The cuff was replaced into the same position in was found to be appropriate with the initial stimulation.   There was excellent respiratory waveforms. The device was then deactivated. Both incisions were closed in 2 layers with 3 0 Vicryl suture and a subcutaneous Monocryl suture. Sterile pressure dressings were applied. The patient was let awaken from anesthesia was extubated in the operating room. He was transported to the postanesthesia care unit in stable condition having tolerated the procedure well. There were no complications. 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 Anticipate full normal recovery  Disposition: PACU - hemodynamically stable.  Condition: stable       Attending Attestation: I performed the procedure.      04/11/24 at 9:39 AM - Jem Mueller MD

## 2024-04-11 NOTE — POST-PROCEDURE NOTE
Discharge instructions explained to patient and copy provided. Patient denies any questions. Patient to follow up with doctor as scheduled. Vital signs obtained. IV site removed, tip intact, pressure applied, site bandaged. Patient getting dressed for discharge with wife's assistance.

## 2024-04-11 NOTE — ANESTHESIA POSTPROCEDURE EVALUATION
Patient: Be Ortega    Procedure Summary       Date: 04/11/24 Room / Location: [facility]    Anesthesia Start: 0754 Anesthesia Stop: 1000    Procedure: Inspire procedure (Right: Chest) Diagnosis:       Obstructive sleep apnea      BMI 31.0-31.9,adult      (Obstructive sleep apnea [G47.33])      (BMI 31.0-31.9,adult [Z68.31])    Surgeons: Jem Mueller MD Responsible Provider: Diego Girard MD    Anesthesia Type: general ASA Status: 2            Anesthesia Type: general    Vitals Value Taken Time   /80 04/11/24 1120   Temp 36.1 °C (97 °F) 04/11/24 1100   Pulse 83 04/11/24 1120   Resp 7 04/11/24 1120   SpO2 91 % 04/11/24 1120   Vitals shown include unfiled device data.    Anesthesia Post Evaluation    Patient location during evaluation: PACU  Patient participation: complete - patient participated  Level of consciousness: awake and alert  Pain score: 3  Pain management: adequate  Multimodal analgesia pain management approach  Airway patency: patent  Two or more strategies used to mitigate risk of obstructive sleep apnea  Cardiovascular status: acceptable and blood pressure returned to baseline  Respiratory status: acceptable  Hydration status: acceptable  Postoperative Nausea and Vomiting: none  Comments: Pt had a significant amount of jaw clenching during emergence (there was no oral airway in place at the time) and during subsequent suction it was noted by several surgical team members that there may have been a small tooth chip present.  I questioned the patient in recovery and he states that he's not noticing anything different at the moment.  I advised that should he get home and notice something different than what he perceives now he could then get in touch with us through his surgeon and we can follow up to further evaluate and remedy.        No notable events documented.

## 2024-04-11 NOTE — PERIOPERATIVE NURSING NOTE
1135- Arrived to [facility] via cart, alert. Denies nausea, pain tolerable 3/10 in neck incision. Ice to area. Neck dressing dry and intact, right chest dressing dry and intact. IV saline locked. VSS. Pt requests to void. Dangles at bedside, denies light head or dizziness. Ambulates to bathroom with steady gait, standby assist. Voided unmeasured amount of urine. Repositioned in bed, set up with drink and snack, pt requests popsicle, call to nutrition for request. Ice pack to right neck. Spouse to bedside, call light within reach. Resting  1215-Rx To Go previously to bedside with home going meds. Pt pain increasing despite ice and reposition. Rates 6/10 right neck/ear. Medicated with Norco, coke given for HA per request. Pt will continue to rest. Call light within reach

## 2024-04-23 ENCOUNTER — OFFICE VISIT (OUTPATIENT)
Dept: OTOLARYNGOLOGY | Facility: CLINIC | Age: 39
End: 2024-04-23
Payer: COMMERCIAL

## 2024-04-23 VITALS — HEIGHT: 66 IN | BODY MASS INDEX: 31.66 KG/M2 | WEIGHT: 197 LBS

## 2024-04-23 DIAGNOSIS — G47.33 OBSTRUCTIVE SLEEP APNEA: Primary | ICD-10-CM

## 2024-04-23 DIAGNOSIS — J34.2 DEVIATED NASAL SEPTUM: ICD-10-CM

## 2024-04-23 PROCEDURE — 99024 POSTOP FOLLOW-UP VISIT: CPT | Performed by: OTOLARYNGOLOGY

## 2024-04-23 PROCEDURE — 3008F BODY MASS INDEX DOCD: CPT | Performed by: OTOLARYNGOLOGY

## 2024-04-23 NOTE — PROGRESS NOTES
Chief Complaint   Patient presents with    Post-op     DOS: 4/11/24 INSPIRE      Date of Evaluation: 4/23/2024   HPI  He returns status post inspire implantation for obstructive sleep apnea on 4/11/2024.  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  It initially stimulated but then did not at the end of the case.  He does have some weakness of the tongue  Be Ortega is a 38 y.o. male seen in consultation at the request of Dr. Barrientos for evaluation for inspire.  He is a  who has a several year history of obstructive sleep apnea.  He has been tried on CPAP BiPAP and dental appliance without continued success.  He winds up ripping it off throughout the night.  His most recent sleep study was September 2023 that showed an RDI 4% of 19.7 with minimal central apnea.  His oxygen tierney was 82%.  He does have hypertension and a family history of coronary artery disease.  He is following with cardiology and being worked up.  He has daytime tiredness and very much wants his sleep apnea under control       Past Medical History:   Diagnosis Date    Angina pectoris (CMS-HCC)     Ast[facility] ([facility])     Cutaneous abscess of buttock 01/27/2016    Abscess of buttock    GERD (gastroesophageal reflux disease)     Hypercholesteremia     Hypertension     Myocardial infarction (Multi)     Other chest pain 06/09/2020    Atypical chest pain    Other muscle spasm 09/16/2015    Trapezius muscle spasm    Other specified diseases of anus and rectum 01/26/2016    Rectal pain    Sleep apnea       Past Surgical History:   Procedure Laterality Date    CARDIAC CATHETERIZATION  2020    Non-critical CAD    CARDIOVASCULAR STRESS TEST      12/2023    OTHER SURGICAL HISTORY  2024    Drug induced sleep endoscopy    WISDOM TOOTH EXTRACTION            Medications:   Current Outpatient Medications   Medication Instructions    amLODIPine (NORVASC) 5 mg, oral, Daily    nitroglycerin  "(NITROSTAT) 0.4 mg, sublingual, Every 5 min PRN    omeprazole (PRILOSEC) 40 mg, oral, Daily, Do not crush or chew.        Allergies:  No Known Allergies     Physical Exam:  Last Recorded Vitals  Height 1.676 m (5' 6\"), weight 89.4 kg (197 lb).  []General appearance: Well-developed, well-nourished in no acute distress, conversant with normal voice quality    Head/face: No erythema or edema or facial tenderness, and normal facial nerve function bilaterally    External ear: Clear external auditory canals with normal pinnae  Tube status: N/A  Middle ear: Tympanic membranes intact and mobile, middle ears normal.  Tympanic membrane perforation: N/A  Mastoid bowl: N/A  Hearing: Normal conversational awareness at normal speech thresholds    Nose visualized using: Anterior rhinoscopy  Nasal dorsum: Nontraumatic midline appearance  Septum: Deviated left  Inferior turbinates: Normal, pink, hypertrophy  Secretions: Dry    Oral cavity and oropharynx: Normal  Teeth: Good condition  Floor of mouth: without lesions  Palate: Normal hard palate, soft palate and uvula  Oropharynx: Clear, no lesions present.  The tongue deviates to the right on protrusion.  No atrophy  Buccal mucosa: Normal without masses or lesions  Lips: Normal    Nasopharynx: Inadequate mirror exam secondary to gag/anatomy    Neck:  Salivary glands: Normal bilateral parotid and submandibular glands by inspection and palpation.  Non-thyroid masses: No palpable masses or significant lymphadenopathy  Trachea: Midline  Thyroid: No thyromegaly or palpable nodules  Temporomandibular joint: Nontender  Cervical range of motion: Normal    Neurologic exam: Alert and oriented x3, appropriate affect.  Cranial nerves II-XII normal bilaterally  Extraocular movement: Extraocular movement intact, normal gaze alignment        Be was seen today for post-op.  Diagnoses and all orders for this visit:  Obstructive sleep apnea (Primary)  Deviated nasal septum  BMI " 30.0-30.9,adult           PLAN  He is status post inspire with weakness of the right hypoglossal nerve.  The nerve is definitely intact and I anticipate complete recovery.  We will hold off on activation until the tongue recovery occurs.  Recheck in 3 weeks.    Jem Mueller MD

## 2024-04-24 ENCOUNTER — APPOINTMENT (OUTPATIENT)
Dept: SLEEP MEDICINE | Facility: CLINIC | Age: 39
End: 2024-04-24
Payer: COMMERCIAL

## 2024-05-17 ENCOUNTER — OFFICE VISIT (OUTPATIENT)
Dept: OTOLARYNGOLOGY | Facility: CLINIC | Age: 39
End: 2024-05-17
Payer: COMMERCIAL

## 2024-05-17 VITALS — BODY MASS INDEX: 31.82 KG/M2 | WEIGHT: 198 LBS | HEIGHT: 66 IN

## 2024-05-17 DIAGNOSIS — G47.33 OBSTRUCTIVE SLEEP APNEA: Primary | ICD-10-CM

## 2024-05-17 DIAGNOSIS — J34.2 DEVIATED NASAL SEPTUM: ICD-10-CM

## 2024-05-17 PROCEDURE — 1036F TOBACCO NON-USER: CPT | Performed by: OTOLARYNGOLOGY

## 2024-05-17 PROCEDURE — 3008F BODY MASS INDEX DOCD: CPT | Performed by: OTOLARYNGOLOGY

## 2024-05-17 PROCEDURE — 99024 POSTOP FOLLOW-UP VISIT: CPT | Performed by: OTOLARYNGOLOGY

## 2024-05-17 NOTE — PROGRESS NOTES
Chief Complaint   Patient presents with    Follow-up     LOV: 4/2024 S/P INSPIRE       Date of Evaluation: 5/17/2024   HPI  He returns status post inspire implantation for obstructive sleep apnea on 4/11/2024.  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  It initially stimulated but then did not at the end of the case.  He does have some weakness of the tongue  Be Ortega is a 38 y.o. male seen in consultation at the request of Dr. Barrientos for evaluation for inspire.  He is a  who has a several year history of obstructive sleep apnea.  He has been tried on CPAP BiPAP and dental appliance without continued success.  He winds up ripping it off throughout the night.  His most recent sleep study was September 2023 that showed an RDI 4% of 19.7 with minimal central apnea.  His oxygen tierney was 82%.  He does have hypertension and a family history of coronary artery disease.  He is following with cardiology and being worked up.  He has daytime tiredness and very much wants his sleep apnea under control       Past Medical History:   Diagnosis Date    Angina pectoris (CMS-HCC)     Asthma ([facility])     Cutaneous abscess of buttock 01/27/2016    Abscess of buttock    GERD (gastroesophageal reflux disease)     Hypercholesteremia     Hypertension     Myocardial infarction (Multi)     Other chest pain 06/09/2020    Atypical chest pain    Other muscle spasm 09/16/2015    Trapezius muscle spasm    Other specified diseases of anus and rectum 01/26/2016    Rectal pain    Sleep apnea       Past Surgical History:   Procedure Laterality Date    CARDIAC CATHETERIZATION  2020    Non-critical CAD    CARDIOVASCULAR STRESS TEST      12/2023    OTHER SURGICAL HISTORY  2024    Drug induced sleep endoscopy    WISDOM TOOTH EXTRACTION            Medications:   Current Outpatient Medications   Medication Instructions    amLODIPine  "(NORVASC) 5 mg, oral, Daily    nitroglycerin (NITROSTAT) 0.4 mg, sublingual, Every 5 min PRN    omeprazole (PRILOSEC) 40 mg, oral, Daily, Do not crush or chew.        Allergies:  No Known Allergies     Physical Exam:  Last Recorded Vitals  Height 1.676 m (5' 6\"), weight 89.8 kg (198 lb).  []General appearance: Well-developed, well-nourished in no acute distress, conversant with normal voice quality    Head/face: No erythema or edema or facial tenderness, and normal facial nerve function bilaterally    External ear: Clear external auditory canals with normal pinnae  Tube status: N/A  Middle ear: Tympanic membranes intact and mobile, middle ears normal.  Tympanic membrane perforation: N/A  Mastoid bowl: N/A  Hearing: Normal conversational awareness at normal speech thresholds    Nose visualized using: Anterior rhinoscopy  Nasal dorsum: Nontraumatic midline appearance  Septum: Deviated left  Inferior turbinates: Normal, pink, hypertrophy  Secretions: Dry    Oral cavity and oropharynx: Normal  Teeth: Good condition  Floor of mouth: without lesions  Palate: Normal hard palate, soft palate and uvula  Oropharynx: Clear, no lesions present.  The tongue deviates to the right on protrusion.  No atrophy.  He is easily able to push the tongue out into the left but there is still asymmetry.  I would estimate 50% recovery in mobility  Buccal mucosa: Normal without masses or lesions  Lips: Normal    Nasopharynx: Inadequate mirror exam secondary to gag/anatomy    Neck:  Salivary glands: Normal bilateral parotid and submandibular glands by inspection and palpation.  Non-thyroid masses: No palpable masses or significant lymphadenopathy  Trachea: Midline  Thyroid: No thyromegaly or palpable nodules  Temporomandibular joint: Nontender  Cervical range of motion: Normal    Neurologic exam: Alert and oriented x3, appropriate affect.  Cranial nerves II-XII normal bilaterally  Extraocular movement: Extraocular movement intact, normal gaze " alignment        There are no diagnoses linked to this encounter.           PLAN  He is status post inspire with weakness of the right hypoglossal nerve.  I estimate 50% recovery in the movement.  I would hold off on activation another month.  Recheck in 1 month  The nerve is definitely intact and I anticipate complete recovery.  We will hold off on activation until the tongue recovery occurs.  Recheck in 3 weeks.    Jem Mueller MD

## 2024-05-17 NOTE — LETTER
Chief Complaint   Patient presents with    Follow-up     LOV: 4/2024 S/P INSPIRE       Date of Evaluation: 5/17/2024   HPI  He returns status post inspire implantation for obstructive sleep apnea on 4/11/2024.  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  It initially stimulated but then did not at the end of the case.  He does have some weakness of the tongue  Be Ortega is a 38 y.o. male seen in consultation at the request of Dr. Barrientos for evaluation for inspire.  He is a  who has a several year history of obstructive sleep apnea.  He has been tried on CPAP BiPAP and dental appliance without continued success.  He winds up ripping it off throughout the night.  His most recent sleep study was September 2023 that showed an RDI 4% of 19.7 with minimal central apnea.  His oxygen tierney was 82%.  He does have hypertension and a family history of coronary artery disease.  He is following with cardiology and being worked up.  He has daytime tiredness and very much wants his sleep apnea under control       Past Medical History:   Diagnosis Date    Angina pectoris (CMS-HCC)     Asthma ([facility])     Cutaneous abscess of buttock 01/27/2016    Abscess of buttock    GERD (gastroesophageal reflux disease)     Hypercholesteremia     Hypertension     Myocardial infarction (Multi)     Other chest pain 06/09/2020    Atypical chest pain    Other muscle spasm 09/16/2015    Trapezius muscle spasm    Other specified diseases of anus and rectum 01/26/2016    Rectal pain    Sleep apnea       Past Surgical History:   Procedure Laterality Date    CARDIAC CATHETERIZATION  2020    Non-critical CAD    CARDIOVASCULAR STRESS TEST      12/2023    OTHER SURGICAL HISTORY  2024    Drug induced sleep endoscopy    WISDOM TOOTH EXTRACTION            Medications:   Current Outpatient Medications   Medication Instructions    amLODIPine  "(NORVASC) 5 mg, oral, Daily    nitroglycerin (NITROSTAT) 0.4 mg, sublingual, Every 5 min PRN    omeprazole (PRILOSEC) 40 mg, oral, Daily, Do not crush or chew.        Allergies:  No Known Allergies     Physical Exam:  Last Recorded Vitals  Height 1.676 m (5' 6\"), weight 89.8 kg (198 lb).  []General appearance: Well-developed, well-nourished in no acute distress, conversant with normal voice quality    Head/face: No erythema or edema or facial tenderness, and normal facial nerve function bilaterally    External ear: Clear external auditory canals with normal pinnae  Tube status: N/A  Middle ear: Tympanic membranes intact and mobile, middle ears normal.  Tympanic membrane perforation: N/A  Mastoid bowl: N/A  Hearing: Normal conversational awareness at normal speech thresholds    Nose visualized using: Anterior rhinoscopy  Nasal dorsum: Nontraumatic midline appearance  Septum: Deviated left  Inferior turbinates: Normal, pink, hypertrophy  Secretions: Dry    Oral cavity and oropharynx: Normal  Teeth: Good condition  Floor of mouth: without lesions  Palate: Normal hard palate, soft palate and uvula  Oropharynx: Clear, no lesions present.  The tongue deviates to the right on protrusion.  No atrophy.  He is easily able to push the tongue out into the left but there is still asymmetry.  I would estimate 50% recovery in mobility  Buccal mucosa: Normal without masses or lesions  Lips: Normal    Nasopharynx: Inadequate mirror exam secondary to gag/anatomy    Neck:  Salivary glands: Normal bilateral parotid and submandibular glands by inspection and palpation.  Non-thyroid masses: No palpable masses or significant lymphadenopathy  Trachea: Midline  Thyroid: No thyromegaly or palpable nodules  Temporomandibular joint: Nontender  Cervical range of motion: Normal    Neurologic exam: Alert and oriented x3, appropriate affect.  Cranial nerves II-XII normal bilaterally  Extraocular movement: Extraocular movement intact, normal gaze " alignment        There are no diagnoses linked to this encounter.           PLAN  He is status post inspire with weakness of the right hypoglossal nerve.  I estimate 50% recovery in the movement.  I would hold off on activation another month.  Recheck in 1 month  The nerve is definitely intact and I anticipate complete recovery.  We will hold off on activation until the tongue recovery occurs.  Recheck in 3 weeks.    Jem Mueller MD

## 2024-05-24 ENCOUNTER — APPOINTMENT (OUTPATIENT)
Dept: SLEEP MEDICINE | Facility: CLINIC | Age: 39
End: 2024-05-24
Payer: COMMERCIAL

## 2024-06-19 ENCOUNTER — APPOINTMENT (OUTPATIENT)
Dept: OTOLARYNGOLOGY | Facility: CLINIC | Age: 39
End: 2024-06-19
Payer: COMMERCIAL

## 2024-06-19 VITALS — WEIGHT: 202 LBS | HEIGHT: 66 IN | BODY MASS INDEX: 32.47 KG/M2 | TEMPERATURE: 96.7 F

## 2024-06-19 DIAGNOSIS — G47.33 OBSTRUCTIVE SLEEP APNEA: Primary | ICD-10-CM

## 2024-06-19 PROCEDURE — 99024 POSTOP FOLLOW-UP VISIT: CPT | Performed by: OTOLARYNGOLOGY

## 2024-06-19 PROCEDURE — 3008F BODY MASS INDEX DOCD: CPT | Performed by: OTOLARYNGOLOGY

## 2024-06-19 PROCEDURE — 1036F TOBACCO NON-USER: CPT | Performed by: OTOLARYNGOLOGY

## 2024-06-19 NOTE — LETTER
Chief Complaint   Patient presents with    Follow-up     1 MON F/U S/P INSPIRE      Date of Evaluation: 6/19/2024   HPI  He returns status post inspire implantation for obstructive sleep apnea on 4/11/2024.  He had partial palsy of his tongue.  He feels as though this has significantly improved with time.  Speech is normal.  Swallowing normal.  Intraoperatively there was a palsy of the inclusionary branch of the right hypoglossal nerve.  It initially stimulated but then did not at the end of the case.  He does have some weakness of the tongue  Be Ortega is a 38 y.o. male seen in consultation at the request of Dr. Barrientos for evaluation for inspire.  He is a  who has a several year history of obstructive sleep apnea.  He has been tried on CPAP BiPAP and dental appliance without continued success.  He winds up ripping it off throughout the night.  His most recent sleep study was September 2023 that showed an RDI 4% of 19.7 with minimal central apnea.  His oxygen tierney was 82%.  He does have hypertension and a family history of coronary artery disease.  He is following with cardiology and being worked up.  He has daytime tiredness and very much wants his sleep apnea under control       Past Medical History:   Diagnosis Date    Angina pectoris (CMS-HCC)     Asthma ([facility])     Cutaneous abscess of buttock 01/27/2016    Abscess of buttock    GERD (gastroesophageal reflux disease)     Hypercholesteremia     Hypertension     Myocardial infarction (Multi)     Other chest pain 06/09/2020    Atypical chest pain    Other muscle spasm 09/16/2015    Trapezius muscle spasm    Other specified diseases of anus and rectum 01/26/2016    Rectal pain    Sleep apnea       Past Surgical History:   Procedure Laterality Date    CARDIAC CATHETERIZATION  2020    Non-critical CAD    CARDIOVASCULAR STRESS TEST      12/2023    OTHER SURGICAL HISTORY  2024    Drug induced sleep endoscopy    WISDOM TOOTH EXTRACTION       "      Medications:   Current Outpatient Medications   Medication Instructions    amLODIPine (NORVASC) 5 mg, oral, Daily    nitroglycerin (NITROSTAT) 0.4 mg, sublingual, Every 5 min PRN    omeprazole (PRILOSEC) 40 mg, oral, Daily, Do not crush or chew.        Allergies:  No Known Allergies     Physical Exam:  Last Recorded Vitals  Temperature 35.9 °C (96.7 °F), height 1.676 m (5' 6\"), weight 91.6 kg (202 lb).  []General appearance: Well-developed, well-nourished in no acute distress, conversant with normal voice quality    Head/face: No erythema or edema or facial tenderness, and normal facial nerve function bilaterally    External ear: Clear external auditory canals with normal pinnae  Tube status: N/A  Middle ear: Tympanic membranes intact and mobile, middle ears normal.  Tympanic membrane perforation: N/A  Mastoid bowl: N/A  Hearing: Normal conversational awareness at normal speech thresholds    Nose visualized using: Anterior rhinoscopy  Nasal dorsum: Nontraumatic midline appearance  Septum: Deviated left  Inferior turbinates: Normal, pink, hypertrophy  Secretions: Dry    Oral cavity and oropharynx: Normal  Teeth: Good condition  Floor of mouth: without lesions  Palate: Normal hard palate, soft palate and uvula  Oropharynx: Clear, no lesions present.  The tongue deviates slightly to the right on protrusion.  No atrophy.  He is easily able to push the tongue out into the left.  I would estimate 80% recovery in mobility  Buccal mucosa: Normal without masses or lesions  Lips: Normal    Nasopharynx: Inadequate mirror exam secondary to gag/anatomy    Neck:  Salivary glands: Normal bilateral parotid and submandibular glands by inspection and palpation.  Non-thyroid masses: No palpable masses or significant lymphadenopathy  Trachea: Midline  Thyroid: No thyromegaly or palpable nodules  Temporomandibular joint: Nontender  Cervical range of motion: Normal    Neurologic exam: Alert and oriented x3, appropriate affect.  " Cranial nerves II-XII normal bilaterally  Extraocular movement: Extraocular movement intact, normal gaze alignment        Be was seen today for follow-up.  Diagnoses and all orders for this visit:  Obstructive sleep apnea (Primary)  BMI 30.0-30.9,adult             PLAN  He is status post inspire with weakness of the right hypoglossal nerve that is resolving spontaneously.  I estimate 80% recovery in the movement.  I believe he can proceed with inspire activation.  He has an appointment to see Dr. Barrientos next week.  I will see him back in 6 months  Jem Mueller MD

## 2024-06-19 NOTE — PROGRESS NOTES
Chief Complaint   Patient presents with    Follow-up     1 MON F/U S/P INSPIRE      Date of Evaluation: 6/19/2024   HPI  He returns status post inspire implantation for obstructive sleep apnea on 4/11/2024.  He had partial palsy of his tongue.  He feels as though this has significantly improved with time.  Speech is normal.  Swallowing normal.  Intraoperatively there was a palsy of the inclusionary branch of the right hypoglossal nerve.  It initially stimulated but then did not at the end of the case.  He does have some weakness of the tongue  Be Ortega is a 38 y.o. male seen in consultation at the request of Dr. Barrientos for evaluation for inspire.  He is a  who has a several year history of obstructive sleep apnea.  He has been tried on CPAP BiPAP and dental appliance without continued success.  He winds up ripping it off throughout the night.  His most recent sleep study was September 2023 that showed an RDI 4% of 19.7 with minimal central apnea.  His oxygen tierney was 82%.  He does have hypertension and a family history of coronary artery disease.  He is following with cardiology and being worked up.  He has daytime tiredness and very much wants his sleep apnea under control       Past Medical History:   Diagnosis Date    Angina pectoris (CMS-HCC)     Asthma ([facility])     Cutaneous abscess of buttock 01/27/2016    Abscess of buttock    GERD (gastroesophageal reflux disease)     Hypercholesteremia     Hypertension     Myocardial infarction (Multi)     Other chest pain 06/09/2020    Atypical chest pain    Other muscle spasm 09/16/2015    Trapezius muscle spasm    Other specified diseases of anus and rectum 01/26/2016    Rectal pain    Sleep apnea       Past Surgical History:   Procedure Laterality Date    CARDIAC CATHETERIZATION  2020    Non-critical CAD    CARDIOVASCULAR STRESS TEST      12/2023    OTHER SURGICAL HISTORY  2024    Drug induced sleep endoscopy    WISDOM TOOTH EXTRACTION       "      Medications:   Current Outpatient Medications   Medication Instructions    amLODIPine (NORVASC) 5 mg, oral, Daily    nitroglycerin (NITROSTAT) 0.4 mg, sublingual, Every 5 min PRN    omeprazole (PRILOSEC) 40 mg, oral, Daily, Do not crush or chew.        Allergies:  No Known Allergies     Physical Exam:  Last Recorded Vitals  Temperature 35.9 °C (96.7 °F), height 1.676 m (5' 6\"), weight 91.6 kg (202 lb).  []General appearance: Well-developed, well-nourished in no acute distress, conversant with normal voice quality    Head/face: No erythema or edema or facial tenderness, and normal facial nerve function bilaterally    External ear: Clear external auditory canals with normal pinnae  Tube status: N/A  Middle ear: Tympanic membranes intact and mobile, middle ears normal.  Tympanic membrane perforation: N/A  Mastoid bowl: N/A  Hearing: Normal conversational awareness at normal speech thresholds    Nose visualized using: Anterior rhinoscopy  Nasal dorsum: Nontraumatic midline appearance  Septum: Deviated left  Inferior turbinates: Normal, pink, hypertrophy  Secretions: Dry    Oral cavity and oropharynx: Normal  Teeth: Good condition  Floor of mouth: without lesions  Palate: Normal hard palate, soft palate and uvula  Oropharynx: Clear, no lesions present.  The tongue deviates slightly to the right on protrusion.  No atrophy.  He is easily able to push the tongue out into the left.  I would estimate 80% recovery in mobility  Buccal mucosa: Normal without masses or lesions  Lips: Normal    Nasopharynx: Inadequate mirror exam secondary to gag/anatomy    Neck:  Salivary glands: Normal bilateral parotid and submandibular glands by inspection and palpation.  Non-thyroid masses: No palpable masses or significant lymphadenopathy  Trachea: Midline  Thyroid: No thyromegaly or palpable nodules  Temporomandibular joint: Nontender  Cervical range of motion: Normal    Neurologic exam: Alert and oriented x3, appropriate affect.  " Cranial nerves II-XII normal bilaterally  Extraocular movement: Extraocular movement intact, normal gaze alignment        Be was seen today for follow-up.  Diagnoses and all orders for this visit:  Obstructive sleep apnea (Primary)  BMI 30.0-30.9,adult             PLAN  He is status post inspire with weakness of the right hypoglossal nerve that is resolving spontaneously.  I estimate 80% recovery in the movement.  I believe he can proceed with inspire activation.  He has an appointment to see Dr. Barrientos next week.  I will see him back in 6 months  Jem Mueller MD

## 2024-06-28 ENCOUNTER — OFFICE VISIT (OUTPATIENT)
Dept: SLEEP MEDICINE | Facility: CLINIC | Age: 39
End: 2024-06-28
Payer: COMMERCIAL

## 2024-06-28 VITALS
OXYGEN SATURATION: 97 % | WEIGHT: 199 LBS | BODY MASS INDEX: 31.98 KG/M2 | HEART RATE: 98 BPM | DIASTOLIC BLOOD PRESSURE: 93 MMHG | SYSTOLIC BLOOD PRESSURE: 137 MMHG | RESPIRATION RATE: 16 BRPM | HEIGHT: 66 IN

## 2024-06-28 DIAGNOSIS — Z78.9 INTOLERANCE OF CONTINUOUS POSITIVE AIRWAY PRESSURE (CPAP) VENTILATION: ICD-10-CM

## 2024-06-28 DIAGNOSIS — E66.9 OBESITY (BMI 30-39.9): ICD-10-CM

## 2024-06-28 DIAGNOSIS — Z96.82 S/P PLACEMENT OF HYPOGLOSSAL NERVE STIMULATOR: ICD-10-CM

## 2024-06-28 DIAGNOSIS — G47.33 OSA (OBSTRUCTIVE SLEEP APNEA): Primary | ICD-10-CM

## 2024-06-28 PROCEDURE — 99214 OFFICE O/P EST MOD 30 MIN: CPT | Performed by: INTERNAL MEDICINE

## 2024-06-28 PROCEDURE — 3008F BODY MASS INDEX DOCD: CPT | Performed by: INTERNAL MEDICINE

## 2024-06-28 PROCEDURE — 95977 ALYS CPLX CN NPGT PRGRMG: CPT | Performed by: INTERNAL MEDICINE

## 2024-06-28 PROCEDURE — 3075F SYST BP GE 130 - 139MM HG: CPT | Performed by: INTERNAL MEDICINE

## 2024-06-28 PROCEDURE — 3080F DIAST BP >= 90 MM HG: CPT | Performed by: INTERNAL MEDICINE

## 2024-06-28 PROCEDURE — 1036F TOBACCO NON-USER: CPT | Performed by: INTERNAL MEDICINE

## 2024-06-28 ASSESSMENT — SLEEP AND FATIGUE QUESTIONNAIRES
HOW LIKELY ARE YOU TO NOD OFF OR FALL ASLEEP WHILE LYING DOWN TO REST IN THE AFTERNOON WHEN CIRCUMSTANCES PERMIT: HIGH CHANCE OF DOZING
HOW LIKELY ARE YOU TO NOD OFF OR FALL ASLEEP WHILE SITTING AND TALKING TO SOMEONE: MODERATE CHANCE OF DOZING
HOW LIKELY ARE YOU TO NOD OFF OR FALL ASLEEP WHEN YOU ARE A PASSENGER IN A CAR FOR AN HOUR WITHOUT A BREAK: HIGH CHANCE OF DOZING
SITING INACTIVE IN A PUBLIC PLACE LIKE A CLASS ROOM OR A MOVIE THEATER: SLIGHT CHANCE OF DOZING
HOW LIKELY ARE YOU TO NOD OFF OR FALL ASLEEP WHILE SITTING QUIETLY AFTER LUNCH WITHOUT ALCOHOL: MODERATE CHANCE OF DOZING
HOW LIKELY ARE YOU TO NOD OFF OR FALL ASLEEP WHILE SITTING AND READING: HIGH CHANCE OF DOZING
HOW LIKELY ARE YOU TO NOD OFF OR FALL ASLEEP IN A CAR, WHILE STOPPED FOR A FEW MINUTES IN TRAFFIC: SLIGHT CHANCE OF DOZING
HOW LIKELY ARE YOU TO NOD OFF OR FALL ASLEEP WHILE WATCHING TV: HIGH CHANCE OF DOZING
ESS-CHAD TOTAL SCORE: 18

## 2024-06-28 ASSESSMENT — PATIENT HEALTH QUESTIONNAIRE - PHQ9
2. FEELING DOWN, DEPRESSED OR HOPELESS: NOT AT ALL
SUM OF ALL RESPONSES TO PHQ9 QUESTIONS 1 AND 2: 0
1. LITTLE INTEREST OR PLEASURE IN DOING THINGS: NOT AT ALL

## 2024-06-28 ASSESSMENT — PAIN SCALES - GENERAL: PAINLEVEL: 0-NO PAIN

## 2024-06-28 NOTE — PROGRESS NOTES
[facility] SLEEP MEDICINE CLINIC  FOLLOW-UP VISIT NOTE      HISTORY OF PRESENT ILLNESS     Patient ID: Be Ortega is a 38 y.o. male who presents here for follow-up to activate Inspire.    Patient is here alone today.  INSPIRE representative Esmeralda is present today to assist. To review, patient's medical history is notable for obesity (BMI 32), HTN, GERD, and NELY s/p INSPIRE implantation    Interval History  Patient was last seen in 9/27/2023. Patient had CPAP and BPAP intolerance due to mask and pressure intolerance. He also tried oral appliance which worked initially but NELY symptoms recurred at maximum setting on oral appliance.  Hence, he was referred to ENT-Sleep for DISE and possible INSPIRE implantation  Since last visit, patient underwent DISE on 1/22/2024 which showed anterior posterior closure of the soft palate/velum with little to no lateral wall collapse. Subsequently, patient had INSPIRE implanted on 4/11/2024 by Dr. Mueller.   Patient reports doing well post-operatively but had neuropraxia initially; hence, his activation was postponed until today. Currently, he denies any difficulty with speech, swallowing, or chewing.  Neuropraxia improved in the past 1 month.    RLS Followup:   Denies    SLEEP STUDY HISTORY (personally reviewed raw data such as interpretation report, data sheet, hypnogram, and titration table if available and applicable)  Split-night PSG 4/21/2016: pre-PAP AHI 14.4, REM AHI 34.3, supine AHI 14.4, SpO2 tierney 89%. CPAP was started at 4-12 cm H2O. At CPAP 9 cm H2O with REM supine sleep, residual AHI was 6, SpO2 tierney 93%, but sleep efficiency was 74%. At CPAP 10 cm H2O with REM supine sleep, residual AHI was 6.3, SpO2 tierney 92%, and sleep efficiency was 100% BMI 28  PAP titration 9/24/2018: BPAP started at 10/6 to 14/8 cm H2O. AHI 22.5, SpO2 tierney 86%. Effective control of NELY not achieved at any BPAP setting.   PSG 9/7/2023: RDI3% 32 (supine RDI3% 51.6, non-supine  RDI3% 9.3), RDI4% 19.7 (supine RDI4% 34.6, non-supine RDI4% 2.5) KAREN 0.8, SpO2 tierney 82%, central apneas 2.4%, Sleep efficiency 90.5%, TST 7.7 hrs.    SLEEP-WAKE SCHEDULE  Bedtime:  8 PM to 8:30 PM on workdays, 9-10 PM on off-workdays  Subjective sleep latency: 5 minutes  Difficulty falling asleep: No   Number of awakenings:  wakes up every hour due to gasping for air (1-2 times to go to bathroom)    Nocturia: Yes   Falls back asleep right away  Final wake time:  3 AM on workdays, 4-5 AM on off-workdays  Out of bed time:  3 AM on workdays, 4-5 AM on off-workdays  Shift work: no. 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  Naps: 3x per week for 1-2 hours  Feels rested after a nap: Yes  Average sleep duration (excluding naps): 7 to 7.5 hours    SLEEP ENVIRONMENT  Sleep location: recliner  Sleep status: sleeps alone while spouse sleeps in a different room  Preferred sleep position: left side and reclined    SOCIAL HISTORY  Smoking: no  ETOH: no  Marijuana: no  Caffeine: 1-2 cups coffee  Sleep aids: no    WEIGHT: gained 5 lbs in 3 months     Claustrophobia: No     REVIEW OF SYSTEMS     All other systems have been reviewed and are negative.    ALLERGIES     No Known Allergies    MEDICATIONS     Current Outpatient Medications   Medication Sig Dispense Refill    amLODIPine (Norvasc) 5 mg tablet Take 1 tablet (5 mg) by mouth once daily. 30 tablet 11    nitroglycerin (Nitrostat) 0.4 mg SL tablet Place 1 tablet (0.4 mg) under the tongue every 5 minutes if needed for chest pain (ANGINA).      omeprazole (PriLOSEC) 40 mg DR capsule Take 1 capsule (40 mg) by mouth once daily. Do not crush or chew. 90 capsule 3     No current facility-administered medications for this visit.       Active Problems, Allergy List, Medication List, and PMH/PSH/FH/Social Hx have been reviewed and reconciled in chart. No significant changes unless documented in the pertinent chart section. Updates made  "when necessary.       PHYSICAL EXAM     VITAL SIGNS: BP (!) 137/93   Pulse 98   Resp 16   Ht 1.676 m (5' 6\")   Wt 90.3 kg (199 lb)   SpO2 97%   BMI 32.12 kg/m²     NECK CIRCUMFERENCE: not measured    Today ESS: 18  Last visit ESS: 16  JAMAAL: 14    Physical Exam  Constitutional: Awake, not in distress  Head: normocephalic, atraumatic  Tongue: midline on protrusion without signs of neurapraxia, no tongue weakness or deviation, able move tongue on all 4 sides.   Neck incision: clean well-defined, well-healed scar. No signs of inflammation or infection.  Chest wall incision: clean well-defined, well-healed scar. No signs of inflammation or infection  Skin: Warm, no rash  Neuro: No tremors, moves all extremities  Psych: alert and oriented to time, place, and person    RESULTS/DATA     No results found for: \"IRON\", \"TRANSFERRIN\", \"IRONSAT\", \"TIBC\", \"FERRITIN\"    Bicarbonate   Date Value Ref Range Status   12/01/2023 25 21 - 32 mmol/L Final       PROCEDURE NOTES     Inspire device settings identified on activation today:  Electrode configuration: +, -, +, off  Sensation threshold (ST): 0.6 volts  Functional threshold (FT): 1.2 volts  Amplitude: 1.2 volts  Patient control: 1.2 to 2.2 volts  Pulse width: 90 microseconds  Rate: 33 Hz  Start delay: 20 minutes  Pause delay: 15 minutes  Therapy Duration: 8 hours    ASSESSMENT/PLAN     Be Ortega is a 38 y.o. male who returns in [facility] for the following problems:    OBSTRUCTIVE SLEEP APNEA, SEVERE positional (PSG RDI3% 32, RDI4% 19.7)  BMI 32.12  CPAP and BPAP intolerance due to mask and pressure intolerance  Failed oral appliance (NELY symptoms recurred at maixmum settings)  S/p placement of hypoglossal nerve stimulator (INSPIRE)  - The Inspire device was interrogated and activated today.  The device was turned on and device settings were evaluated for acceptable tongue movement and comfort. Both sensation threshold (ST) and " functional threshold (FT) were determined.   - The waveform was evaluated in upright and awake position. An adequate rise and fall was observed. Screenshot of waveform was taken. Patient was comfortable with the stimulation testing.  - Confirmed that patient was provided with Inspire instructional information. DAVID downloaded to patient's cellphone. Advised patient to place Inspire ID card in his wallet.  - During this visit, the patient was educated formally on the use of remote control. Patient demonstrated competency with the remote control. Patient was also aware of the quick guide and instructional video.   - Advised patient to slowly step up the therapy's stimulation level (i.e. increase the voltage) once per week.   - Emphasis placed on the importance to avoid rushing the step up process was reinforced at today's visit.  - Instructed patient to stop increasing steps (i.e. voltage) if sleep quality is improved with reduction of snoring.  - Instructed patient to stop increasing steps (i.e. voltage) and possibly reduce to one level lower if there is any discomfort.   - All questions answered. Inspire help hotline also given to patient.  - For Inspire titration study soon.   - Supportive management: Lose weight. Stay off your back when sleeping. Avoid smoking, alcohol, and sedating medications if applicable. Don't drive when sleepy.     OBESITY  - Advised patient to lose weight.   - Defer management to PCP    HYPERTENSION  - BP slightly high today. Untreated  or inadequately treated sleep apnea can lead to new onset hypertension, resistant hypertension, or refractory hypertension.  - Continue anti-hypertensive medications per PCP.  - Supportive management: low salt DASH diet (less than 2000 mg sodium intake daily), moderate intensity aerobic exercise at least 30 minutes 5 days per week, reduce stress, quit smoking, limit alcohol, lose weight, and monitor BP once daily  - PCP following. Defer management to  PCP.    All of patient's questions were answered. He verbalizes understanding and agreement with my assessment and plan.    Follow-up in 4-6 weeks

## 2024-07-02 DIAGNOSIS — Z00.00 WELL ADULT EXAM: Primary | ICD-10-CM

## 2024-08-09 ENCOUNTER — LAB (OUTPATIENT)
Dept: LAB | Facility: LAB | Age: 39
End: 2024-08-09
Payer: COMMERCIAL

## 2024-08-09 DIAGNOSIS — Z00.00 WELL ADULT EXAM: ICD-10-CM

## 2024-08-09 PROCEDURE — 36415 COLL VENOUS BLD VENIPUNCTURE: CPT

## 2024-08-09 PROCEDURE — 83036 HEMOGLOBIN GLYCOSYLATED A1C: CPT

## 2024-08-10 LAB
EST. AVERAGE GLUCOSE BLD GHB EST-MCNC: 97 MG/DL
HBA1C MFR BLD: 5 %

## 2024-08-12 ENCOUNTER — TELEPHONE (OUTPATIENT)
Dept: PRIMARY CARE | Facility: CLINIC | Age: 39
End: 2024-08-12
Payer: COMMERCIAL

## 2024-08-12 NOTE — TELEPHONE ENCOUNTER
Result Communication    Resulted Orders   Hemoglobin A1C   Result Value Ref Range    Hemoglobin A1C 5.0 see below %    Estimated Average Glucose 97 Not Established mg/dL    Narrative    Diagnosis of Diabetes-Adults  Non-Diabetic: < or = 5.6%  Increased risk for developing diabetes: 5.7-6.4%  Diagnostic of diabetes: > or = 6.5%           2:43 PM      LVM

## 2024-08-23 ENCOUNTER — OFFICE VISIT (OUTPATIENT)
Dept: SLEEP MEDICINE | Facility: CLINIC | Age: 39
End: 2024-08-23
Payer: COMMERCIAL

## 2024-08-23 VITALS
HEIGHT: 66 IN | RESPIRATION RATE: 16 BRPM | WEIGHT: 190 LBS | HEART RATE: 89 BPM | OXYGEN SATURATION: 97 % | SYSTOLIC BLOOD PRESSURE: 125 MMHG | BODY MASS INDEX: 30.53 KG/M2 | DIASTOLIC BLOOD PRESSURE: 89 MMHG

## 2024-08-23 DIAGNOSIS — E66.9 OBESITY (BMI 30-39.9): ICD-10-CM

## 2024-08-23 DIAGNOSIS — G47.33 OSA (OBSTRUCTIVE SLEEP APNEA): Primary | ICD-10-CM

## 2024-08-23 DIAGNOSIS — Z96.82 S/P PLACEMENT OF HYPOGLOSSAL NERVE STIMULATOR: ICD-10-CM

## 2024-08-23 PROCEDURE — 3079F DIAST BP 80-89 MM HG: CPT | Performed by: INTERNAL MEDICINE

## 2024-08-23 PROCEDURE — 3074F SYST BP LT 130 MM HG: CPT | Performed by: INTERNAL MEDICINE

## 2024-08-23 PROCEDURE — 99214 OFFICE O/P EST MOD 30 MIN: CPT | Performed by: INTERNAL MEDICINE

## 2024-08-23 PROCEDURE — 95977 ALYS CPLX CN NPGT PRGRMG: CPT | Performed by: INTERNAL MEDICINE

## 2024-08-23 PROCEDURE — 3008F BODY MASS INDEX DOCD: CPT | Performed by: INTERNAL MEDICINE

## 2024-08-23 ASSESSMENT — SLEEP AND FATIGUE QUESTIONNAIRES
SITING INACTIVE IN A PUBLIC PLACE LIKE A CLASS ROOM OR A MOVIE THEATER: SLIGHT CHANCE OF DOZING
HOW LIKELY ARE YOU TO NOD OFF OR FALL ASLEEP WHILE WATCHING TV: SLIGHT CHANCE OF DOZING
ESS-CHAD TOTAL SCORE: 4
HOW LIKELY ARE YOU TO NOD OFF OR FALL ASLEEP IN A CAR, WHILE STOPPED FOR A FEW MINUTES IN TRAFFIC: WOULD NEVER DOZE
HOW LIKELY ARE YOU TO NOD OFF OR FALL ASLEEP WHILE LYING DOWN TO REST IN THE AFTERNOON WHEN CIRCUMSTANCES PERMIT: SLIGHT CHANCE OF DOZING
HOW LIKELY ARE YOU TO NOD OFF OR FALL ASLEEP WHEN YOU ARE A PASSENGER IN A CAR FOR AN HOUR WITHOUT A BREAK: WOULD NEVER DOZE
HOW LIKELY ARE YOU TO NOD OFF OR FALL ASLEEP WHILE SITTING AND READING: SLIGHT CHANCE OF DOZING
HOW LIKELY ARE YOU TO NOD OFF OR FALL ASLEEP WHILE SITTING AND TALKING TO SOMEONE: WOULD NEVER DOZE
HOW LIKELY ARE YOU TO NOD OFF OR FALL ASLEEP WHILE SITTING QUIETLY AFTER LUNCH WITHOUT ALCOHOL: WOULD NEVER DOZE

## 2024-08-23 ASSESSMENT — PATIENT HEALTH QUESTIONNAIRE - PHQ9
SUM OF ALL RESPONSES TO PHQ9 QUESTIONS 1 AND 2: 0
2. FEELING DOWN, DEPRESSED OR HOPELESS: NOT AT ALL
1. LITTLE INTEREST OR PLEASURE IN DOING THINGS: NOT AT ALL

## 2024-08-23 ASSESSMENT — PAIN SCALES - GENERAL: PAINLEVEL: 0-NO PAIN

## 2024-08-23 NOTE — PROGRESS NOTES
[facility] SLEEP MEDICINE CLINIC  FOLLOW-UP VISIT NOTE      HISTORY OF PRESENT ILLNESS     Patient ID: Be Ortega is a 39 y.o. male who presents here for follow-up of NELY on Inspire few weeks after activation to evaluate patient's response to therapy.    Patient is here alone today.  INSPIRE representative [place] is present today to assist. To review, patient's medical history is notable for obesity (BMI 30), HTN, GERD, and NELY s/p INSPIRE implantation.     Interval History  Patient was last seen in 6/28/2024.   Since last visit, patient has been stepping up the Inspire amplitude and currently on level 7 (~ incoming amplitude of 1.8 volts and patient control at 1.2 to 2.2 volts)  Per patient, starting at 1.4 to 1.5 volts, he started sleeping better and at 1.6 volts, his wife told him that does not snore anymore. He tried 1.9 volts but did not tolerate. He felt 1.9 volts was too strong as he was tossing and turning in bed at night. He is currently at 1.8 volts and noted that the tongue was rubbing the sides of his lower teeth.    RLS Followup:   Denies    SLEEP STUDY HISTORY (personally reviewed raw data such as interpretation report, data sheet, hypnogram, and titration table if available and applicable)  Split-night PSG 4/21/2016: pre-PAP AHI 14.4, REM AHI 34.3, supine AHI 14.4, SpO2 tierney 89%. CPAP was started at 4-12 cm H2O. At CPAP 9 cm H2O with REM supine sleep, residual AHI was 6, SpO2 tierney 93%, but sleep efficiency was 74%. At CPAP 10 cm H2O with REM supine sleep, residual AHI was 6.3, SpO2 tierney 92%, and sleep efficiency was 100% BMI 28  PAP titration 9/24/2018: BPAP started at 10/6 to 14/8 cm H2O. AHI 22.5, SpO2 tierney 86%. Effective control of NELY not achieved at any BPAP setting.   PSG 9/7/2023: RDI3% 32 (supine RDI3% 51.6, non-supine RDI3% 9.3), RDI4% 19.7 (supine RDI4% 34.6, non-supine RDI4% 2.5) KAREN 0.8, SpO2 tierney 82%, central apneas 2.4%, Sleep efficiency 90.5%, TST 7.7  hrs.    SLEEP-WAKE SCHEDULE  Bedtime:  8 PM to 8:30 PM on workdays, 9-10 PM on off-workdays  Subjective sleep latency: 30 minutes  Difficulty falling asleep: No   Number of awakenings:  wakes up every hour due to gasping for air (1-2 times to go to bathroom) without NELY treatment. Now sleeps thru the night on INSPIRE. Some nights, he has increased nocturnal awakening due to work or eating late.  Nocturia: Yes   Falls back asleep right away  Final wake time:  3 AM on workdays, 4-5 AM on off-workdays  Out of bed time:  3 AM on workdays, 4-5 AM on off-workdays  Shift work: no. 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  Naps: 3x per week for 1-2 hours without NELY treatment, no napping on INSPIREfor the first 3 weeks but after that, he started napping 2x per week for 20 minutes  Feels rested after a nap: Yes  Average sleep duration (excluding naps): 7 to 7.5 hours    SLEEP ENVIRONMENT  Sleep location: bed  Sleep status: sleeps alone while spouse sleeps in a different room  Preferred sleep position: left side and reclined    SOCIAL HISTORY  Smoking: no  ETOH: no  Marijuana: no  Caffeine: 1-2 cups coffee daily  Sleep aids: no    WEIGHT: lost 9 lbs in 2 months     Claustrophobia: No       REVIEW OF SYSTEMS     All other systems have been reviewed and are negative.    ALLERGIES     No Known Allergies    MEDICATIONS     Current Outpatient Medications   Medication Sig Dispense Refill    amLODIPine (Norvasc) 5 mg tablet Take 1 tablet (5 mg) by mouth once daily. 30 tablet 11    nitroglycerin (Nitrostat) 0.4 mg SL tablet Place 1 tablet (0.4 mg) under the tongue every 5 minutes if needed for chest pain (ANGINA).      omeprazole (PriLOSEC) 40 mg DR capsule Take 1 capsule (40 mg) by mouth once daily. Do not crush or chew. 90 capsule 3     No current facility-administered medications for this visit.       Active Problems, Allergy List, Medication List, and PMH/PSH/FH/Social Hx have  "been reviewed and reconciled in chart. No significant changes unless documented in the pertinent chart section. Updates made when necessary.     PHYSICAL EXAM     VITAL SIGNS: /89   Pulse 89   Resp 16   Ht 1.676 m (5' 6\")   Wt 86.2 kg (190 lb)   SpO2 97%   BMI 30.67 kg/m²     NECK CIRCUMFERENCE: not measured    Today ESS: 4  Last visit ESS: 18  JAMAAL: 14    Physical Exam  Constitutional: Awake, not in distress  Head: normocephalic, atraumatic  Tongue:  Tongue motion was assessed and deemed appropriate.   Skin: Warm, no rash  Neuro: No tremors, moves all extremities  Psych: alert and oriented to time, place, and person    RESULTS/DATA     No results found for: \"IRON\", \"TRANSFERRIN\", \"IRONSAT\", \"TIBC\", \"FERRITIN\"    Bicarbonate   Date Value Ref Range Status   12/01/2023 25 21 - 32 mmol/L Final       PROCEDURE NOTES     Sleep study used for Inspire eligibility: diagnostic PSG 9/7/2023  Date of DISE: 1/22/2024 by Dr. Mueller   Date of Inspire implantation : 4/11/2024 by Dr. Mueller    Date of initial HNS activation: 6/28/2024  Date of Inspire titration study done: soon    INCOMING device settings:  Electrode configuration: +, -, +, off  Sensation threshold (ST): 0.6 volts  Functional threshold (FT): 1.2 volts  Amplitude: 1.8 volts  Patient control: 1.2 to 2.2 volts  Usage hours: 38 hours per week  Pulse width: 90 microseconds  Rate: 33 Hz  Start delay: 20 minutes  Pause delay: 15 minutes  Therapy Duration: 8 hours    Changes made at today's visit:  Electrode configuration: +, -, +, off  Sensation threshold (ST): 0.8 volts  Functional threshold (FT): 1.3 volts  Amplitude: 1.4 volts  Patient control: 1.3 to 2.3 volts  Pulse width: 90 microseconds  Rate: 33 Hz  Start delay: 45 minutes  Pause delay: 15 minutes  Therapy Duration: 8 hours    ASSESSMENT/PLAN     Be Ortega is a 39 y.o. male who returns in [facility] for the following problems:    OBSTRUCTIVE SLEEP APNEA, SEVERE " positional (PSG RDI3% 32, RDI4% 19.7)  BMI 32.12  CPAP and BPAP intolerance due to mask and pressure intolerance  Failed oral appliance (NELY symptoms recurred at maixmum settings)  S/p placement of hypoglossal nerve stimulator (INSPIRE)  - The Inspire device was interrogated today. Patient is currently at incoming amplitude of 1.8 volts (Pt control 1.2 to 2.2 volts) with INSPIRE usage of 38 hours per week.  - Reviewed Inspire cloud report. Patient reports tolerating amplitudes well with improved NELY symptoms.  - The device settings were evaluated for acceptable tongue movement and comfort. Both sensation threshold (ST) and functional threshold (FT) were determined.   - The waveform was evaluated in upright and awake position. An adequate rise and fall was observed. Screenshot of waveform was taken. Patient was comfortable with the stimulation testing.  - Changes made today on Inspire settings are as follows: amplitude 1.4 volts, patient control 1.3 to 2.3 volts, start delay 45 mins  - Also consider wearing mouth guard if he reached 1.8 volts again to prevent rubbing of tongue on his tooth  - Advised patient to continue stepping up the therapy's stimulation level (i.e. increase the voltage) once per week.   - Instructed patient to stop increasing steps (i.e. voltage) if sleep quality is improved with reduction of snoring.  - Instructed patient to stop increasing steps (i.e. voltage) and possibly reduce to one level lower if there is any discomfort.   - Supportive management: Lose weight. Stay off your back when sleeping. Avoid smoking, alcohol, and sedating medications if applicable. Don't drive when sleepy.     OBESITY  - Advised patient to lose weight.   - Defer management to PCP    HYPERTENSION  - BP stable today.  - Continue anti-hypertensive medications per PCP.  - Supportive management: low salt DASH diet (less than 2000 mg sodium intake daily), moderate intensity aerobic exercise at least 30 minutes 5 days per  week, reduce stress, quit smoking, limit alcohol, lose weight, and monitor BP once daily  - PCP following. Defer management to PCP.    All of patient's questions were answered. He verbalizes understanding and agreement with my assessment and plan.    Follow-up in 1 month

## 2024-08-29 PROBLEM — Z96.82 S/P PLACEMENT OF HYPOGLOSSAL NERVE STIMULATOR: Status: ACTIVE | Noted: 2024-08-29

## 2024-08-29 PROBLEM — E66.9 OBESITY (BMI 30-39.9): Status: ACTIVE | Noted: 2023-11-12

## 2024-10-02 ENCOUNTER — OFFICE VISIT (OUTPATIENT)
Dept: SLEEP MEDICINE | Facility: CLINIC | Age: 39
End: 2024-10-02
Payer: COMMERCIAL

## 2024-10-02 VITALS
HEIGHT: 66 IN | RESPIRATION RATE: 16 BRPM | BODY MASS INDEX: 30.37 KG/M2 | HEART RATE: 96 BPM | SYSTOLIC BLOOD PRESSURE: 134 MMHG | DIASTOLIC BLOOD PRESSURE: 92 MMHG | WEIGHT: 189 LBS | OXYGEN SATURATION: 95 %

## 2024-10-02 DIAGNOSIS — Z96.82 S/P PLACEMENT OF HYPOGLOSSAL NERVE STIMULATOR: ICD-10-CM

## 2024-10-02 DIAGNOSIS — G47.33 OSA (OBSTRUCTIVE SLEEP APNEA): Primary | ICD-10-CM

## 2024-10-02 PROCEDURE — 3080F DIAST BP >= 90 MM HG: CPT | Performed by: INTERNAL MEDICINE

## 2024-10-02 PROCEDURE — 3008F BODY MASS INDEX DOCD: CPT | Performed by: INTERNAL MEDICINE

## 2024-10-02 PROCEDURE — 1036F TOBACCO NON-USER: CPT | Performed by: INTERNAL MEDICINE

## 2024-10-02 PROCEDURE — 95977 ALYS CPLX CN NPGT PRGRMG: CPT | Performed by: INTERNAL MEDICINE

## 2024-10-02 PROCEDURE — 99214 OFFICE O/P EST MOD 30 MIN: CPT | Performed by: INTERNAL MEDICINE

## 2024-10-02 PROCEDURE — 3075F SYST BP GE 130 - 139MM HG: CPT | Performed by: INTERNAL MEDICINE

## 2024-10-02 ASSESSMENT — SLEEP AND FATIGUE QUESTIONNAIRES
SITING INACTIVE IN A PUBLIC PLACE LIKE A CLASS ROOM OR A MOVIE THEATER: SLIGHT CHANCE OF DOZING
HOW LIKELY ARE YOU TO NOD OFF OR FALL ASLEEP WHEN YOU ARE A PASSENGER IN A CAR FOR AN HOUR WITHOUT A BREAK: MODERATE CHANCE OF DOZING
HOW LIKELY ARE YOU TO NOD OFF OR FALL ASLEEP IN A CAR, WHILE STOPPED FOR A FEW MINUTES IN TRAFFIC: SLIGHT CHANCE OF DOZING
HOW LIKELY ARE YOU TO NOD OFF OR FALL ASLEEP WHILE WATCHING TV: HIGH CHANCE OF DOZING
ESS-CHAD TOTAL SCORE: 16
HOW LIKELY ARE YOU TO NOD OFF OR FALL ASLEEP WHILE SITTING AND TALKING TO SOMEONE: SLIGHT CHANCE OF DOZING
HOW LIKELY ARE YOU TO NOD OFF OR FALL ASLEEP WHILE SITTING QUIETLY AFTER LUNCH WITHOUT ALCOHOL: HIGH CHANCE OF DOZING
HOW LIKELY ARE YOU TO NOD OFF OR FALL ASLEEP WHILE SITTING AND READING: HIGH CHANCE OF DOZING
HOW LIKELY ARE YOU TO NOD OFF OR FALL ASLEEP WHILE LYING DOWN TO REST IN THE AFTERNOON WHEN CIRCUMSTANCES PERMIT: MODERATE CHANCE OF DOZING

## 2024-10-02 ASSESSMENT — PAIN SCALES - GENERAL: PAINLEVEL: 0-NO PAIN

## 2024-10-02 NOTE — PROGRESS NOTES
[facility] SLEEP MEDICINE   FOLLOW-UP VISIT NOTE      HISTORY OF PRESENT ILLNESS     Patient ID: Be Ortega is a 39 y.o. male who presents here for follow-up of NELY on Inspire  weeks after activation to evaluate patient's response to therapy.    Patient is here alone today.  INSPIRE representative Michel is present today to assist. To review, patient's medical history is notable for obesity (BMI 30), HTN, GERD, and NELY s/p INSPIRE implantation.     Interval History  Patient was last seen in 8/23/2024.   Since last visit, patient has been stepping up the Inspire amplitude and currently on level 5 (~ incoming amplitude of 1.7 volts and patient control at 1.3 to 2.3 volts)  Per patient, he does not snore at night when using Inspire. He starts to nap more frequently for longer period of time since last visit. He is not waking up rested in the morning because he is waking up every hour from the strong pulsations of INSPIRE. He complains that his tongue seemed to move far to the left and has been curling upward in the past 1-2 weeks (1.6 to 1.7 volts)    RLS Followup:   Denies    SLEEP STUDY HISTORY (personally reviewed raw data such as interpretation report, data sheet, hypnogram, and titration table if available and applicable)  Split-night PSG 4/21/2016: pre-PAP AHI 14.4, REM AHI 34.3, supine AHI 14.4, SpO2 tierney 89%. CPAP was started at 4-12 cm H2O. At CPAP 9 cm H2O with REM supine sleep, residual AHI was 6, SpO2 tierney 93%, but sleep efficiency was 74%. At CPAP 10 cm H2O with REM supine sleep, residual AHI was 6.3, SpO2 tierney 92%, and sleep efficiency was 100% BMI 28  PAP titration 9/24/2018: BPAP started at 10/6 to 14/8 cm H2O. AHI 22.5, SpO2 tierney 86%. Effective control of NELY not achieved at any BPAP setting.   PSG 9/7/2023: RDI3% 32 (supine RDI3% 51.6, non-supine RDI3% 9.3), RDI4% 19.7 (supine RDI4% 34.6, non-supine RDI4% 2.5) KAREN 0.8, SpO2 tierney 82%, central apneas 2.4%, Sleep efficiency  90.5%, TST 7.7 hrs.    SLEEP-WAKE SCHEDULE  Bedtime:  8:30 PM to 9 PM on workdays, 9-10 PM on off-workdays  Subjective sleep latency: 30 minutes  Number of awakenings:  Now wakes up every 45-60 minutes due to the INSPIRE pulsations    Nocturia: No   Falls back asleep right away  Final wake time:  3:30 AM on workdays, 6-7 AM on off-workdays  Out of bed time:  3:30 AM on workdays, 6-7 AM on off-workdays  Shift work: no  Naps: 3x per week for 1-2 hours without NELY treatment, no napping on INSPIRE for the first 3 weeks within activation date but after that, he started napping 2x per week for 20 minutes until 8/23/2024 visit. Since last visit in Aug, he is napping more for about 3-4 times per week for 1 hour each nap.   Feels rested after a nap: No  Average sleep duration (excluding naps): 7 to 7.5 hours    SLEEP ENVIRONMENT  Sleep location: bed  Sleep status: sleeps alone while spouse sleeps in a different room  Preferred sleep position: left side and reclined    SOCIAL HISTORY  Smoking: no  ETOH: no  Marijuana: no  Caffeine: 1-2 cups coffee daily  Sleep aids: no    WEIGHT: stable    Claustrophobia: No       REVIEW OF SYSTEMS     All other systems have been reviewed and are negative.    ALLERGIES     No Known Allergies    MEDICATIONS     Current Outpatient Medications   Medication Sig Dispense Refill    amLODIPine (Norvasc) 5 mg tablet Take 1 tablet (5 mg) by mouth once daily. 30 tablet 11    nitroglycerin (Nitrostat) 0.4 mg SL tablet Place 1 tablet (0.4 mg) under the tongue every 5 minutes if needed for chest pain (ANGINA).      omeprazole (PriLOSEC) 40 mg DR capsule Take 1 capsule (40 mg) by mouth once daily. Do not crush or chew. 90 capsule 3     No current facility-administered medications for this visit.       Active Problems, Allergy List, Medication List, and PMH/PSH/FH/Social Hx have been reviewed and reconciled in chart. No significant changes unless documented in the pertinent chart section. Updates made when  "necessary.     PHYSICAL EXAM     VITAL SIGNS: BP (!) 134/92   Pulse 96   Resp 16   Ht 1.676 m (5' 6\")   Wt 85.7 kg (189 lb)   SpO2 95%   BMI 30.51 kg/m²     NECK CIRCUMFERENCE: not measured    Today ESS: 16  Last visit ESS: 4  JAMAAL: 14    Physical Exam  Constitutional: Awake, not in distress  Head: normocephalic, atraumatic  Tongue:  Tongue motion was assessed and deemed appropriate. Tongue protrusion was lateralizing left and upward past the incisors at current incoming amplitude.  Lungs: Clear to auscultation bilateral, no rales  Heart: Regular rate and rhythm, no murmurs  Skin: Warm, no rash  Neuro: No tremors, moves all extremities  Psych: alert and oriented to time, place, and person    RESULTS/DATA     No results found for: \"IRON\", \"TRANSFERRIN\", \"IRONSAT\", \"TIBC\", \"FERRITIN\"    Bicarbonate   Date Value Ref Range Status   12/01/2023 25 21 - 32 mmol/L Final       PROCEDURE NOTES     Sleep study used for Inspire eligibility: diagnostic PSG 9/7/2023  Date of DISE: 1/22/2024 by Dr. Mueller   Date of Inspire implantation : 4/11/2024 by Dr. Mueller    Date of initial HNS activation: 6/28/2024  Date of Inspire titration study done: soon     INCOMING device settings:  Electrode configuration: +, -, +, off  Sensation threshold (ST): 0.8 volts  Functional threshold (FT): 1.3 volts  Amplitude: 1.4 volts  Patient control: 1.3 to 2.3 volts  Pulse width: 90 microseconds  Rate: 33 Hz  Start delay: 45 minutes  Pause delay: 15 minutes  Therapy Duration: 8 hours    Changes made at today's visit:  Electrode configuration: +, -, +, off  Sensation threshold (ST): 0.6 volts  Functional threshold (FT): 0.9 volts  Amplitude: 0.9 volts  Patient control: 0.7 to 1.7 volts  Pulse width: 90 microseconds  Rate: 33 Hz  Start delay: 30 minutes  Pause delay: 15 minutes  Therapy Duration: 8 hours    ASSESSMENT/PLAN     Be Ortega is a 39 y.o. male who returns in [facility] for the following " problems:    OBSTRUCTIVE SLEEP APNEA, SEVERE positional (PSG RDI3% 32, RDI4% 19.7)  BMI 32.12  CPAP and BPAP intolerance due to mask and pressure intolerance  Failed oral appliance (NELY symptoms recurred at maixmum settings)  S/p placement of hypoglossal nerve stimulator (INSPIRE)  - The Inspire device was interrogated today. Patient is currently at incoming amplitude of 1.7 volts (Pt control 1.3 to 2.3 volts) with INSPIRE usage of 20 hours per week.  - Reviewed Inspire cloud report. Patient reports tolerating amplitudes well with improved NELY symptoms.  - The device settings were evaluated for acceptable tongue movement and comfort. Both sensation threshold (ST) and functional threshold (FT) were determined.   - The waveform was evaluated in upright and awake position. An adequate rise and fall was observed. Screenshot of waveform was taken. Patient was comfortable with the stimulation testing.  - Due to amplitude intolerance, changes made today on Inspire settings are as follows: amplitude 0.9 volts, patient control 0.7 to 1.7 volts, and start delay 30 minutes  - Advised patient to continue stepping up the therapy's stimulation level (i.e. increase the voltage) once per week.   - Instructed patient to stop increasing steps (i.e. voltage) if sleep quality is improved with reduction of snoring.  - Instructed patient to stop increasing steps (i.e. voltage) and possibly reduce to one level lower if there is any discomfort.   - Ordered INSPIRE titration.   - Supportive management: Lose weight. Stay off your back when sleeping. Avoid smoking, alcohol, and sedating medications if applicable. Don't drive when sleepy.     OBESITY  - Advised patient to lose weight.   - Defer management to PCP    HYPERTENSION  - BP slightly high today. Untreated  or inadequately treated sleep apnea can lead to new onset hypertension, resistant hypertension, or refractory hypertension.  - Continue anti-hypertensive medications per PCP.  -  Supportive management: low salt DASH diet (less than 2000 mg sodium intake daily), moderate intensity aerobic exercise at least 30 minutes 5 days per week, reduce stress, quit smoking, limit alcohol, lose weight, and monitor BP once daily  - PCP following. Defer management to PCP.    All of patient's questions were answered. He verbalizes understanding and agreement with my assessment and plan.    Follow-up in 4-6 weeks

## 2024-11-18 ENCOUNTER — TELEPHONE (OUTPATIENT)
Dept: SLEEP MEDICINE | Facility: CLINIC | Age: 39
End: 2024-11-18
Payer: COMMERCIAL

## 2024-11-18 NOTE — TELEPHONE ENCOUNTER
Patient called and said that when he is turning off of his Inspire device he says he can still feel it. Says it is a slight spasm from the device within his neck and he said almost like his jaw is locking out. States even when the device is turned off it does still feel on.    Patient states it does not hurt but that it is a weird.    I called Dr. 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    I told the patient I will reach out to an Inspire rep as well to let them know of what is going on.    [phone]  Work cell: [phone]

## 2024-11-20 ENCOUNTER — OFFICE VISIT (OUTPATIENT)
Dept: SLEEP MEDICINE | Facility: CLINIC | Age: 39
End: 2024-11-20
Payer: COMMERCIAL

## 2024-11-20 VITALS
OXYGEN SATURATION: 99 % | HEART RATE: 93 BPM | DIASTOLIC BLOOD PRESSURE: 86 MMHG | RESPIRATION RATE: 16 BRPM | SYSTOLIC BLOOD PRESSURE: 134 MMHG

## 2024-11-20 DIAGNOSIS — G47.33 OSA (OBSTRUCTIVE SLEEP APNEA): Primary | ICD-10-CM

## 2024-11-20 DIAGNOSIS — Z78.9 INTOLERANCE OF CONTINUOUS POSITIVE AIRWAY PRESSURE (CPAP) VENTILATION: ICD-10-CM

## 2024-11-20 DIAGNOSIS — Z96.82 S/P PLACEMENT OF HYPOGLOSSAL NERVE STIMULATOR: ICD-10-CM

## 2024-11-20 PROCEDURE — 3079F DIAST BP 80-89 MM HG: CPT | Performed by: INTERNAL MEDICINE

## 2024-11-20 PROCEDURE — 1036F TOBACCO NON-USER: CPT | Performed by: INTERNAL MEDICINE

## 2024-11-20 PROCEDURE — 95977 ALYS CPLX CN NPGT PRGRMG: CPT | Performed by: INTERNAL MEDICINE

## 2024-11-20 PROCEDURE — 99214 OFFICE O/P EST MOD 30 MIN: CPT | Performed by: INTERNAL MEDICINE

## 2024-11-20 PROCEDURE — 3075F SYST BP GE 130 - 139MM HG: CPT | Performed by: INTERNAL MEDICINE

## 2024-11-20 ASSESSMENT — SLEEP AND FATIGUE QUESTIONNAIRES
HOW LIKELY ARE YOU TO NOD OFF OR FALL ASLEEP WHILE LYING DOWN TO REST IN THE AFTERNOON WHEN CIRCUMSTANCES PERMIT: HIGH CHANCE OF DOZING
HOW LIKELY ARE YOU TO NOD OFF OR FALL ASLEEP IN A CAR, WHILE STOPPED FOR A FEW MINUTES IN TRAFFIC: WOULD NEVER DOZE
HOW LIKELY ARE YOU TO NOD OFF OR FALL ASLEEP WHILE SITTING AND TALKING TO SOMEONE: SLIGHT CHANCE OF DOZING
HOW LIKELY ARE YOU TO NOD OFF OR FALL ASLEEP WHEN YOU ARE A PASSENGER IN A CAR FOR AN HOUR WITHOUT A BREAK: MODERATE CHANCE OF DOZING
HOW LIKELY ARE YOU TO NOD OFF OR FALL ASLEEP WHILE WATCHING TV: HIGH CHANCE OF DOZING
HOW LIKELY ARE YOU TO NOD OFF OR FALL ASLEEP WHILE SITTING QUIETLY AFTER LUNCH WITHOUT ALCOHOL: MODERATE CHANCE OF DOZING
HOW LIKELY ARE YOU TO NOD OFF OR FALL ASLEEP WHILE SITTING AND READING: MODERATE CHANCE OF DOZING
ESS-CHAD TOTAL SCORE: 14
SITING INACTIVE IN A PUBLIC PLACE LIKE A CLASS ROOM OR A MOVIE THEATER: SLIGHT CHANCE OF DOZING

## 2024-11-20 ASSESSMENT — PATIENT HEALTH QUESTIONNAIRE - PHQ9
1. LITTLE INTEREST OR PLEASURE IN DOING THINGS: NOT AT ALL
SUM OF ALL RESPONSES TO PHQ9 QUESTIONS 1 AND 2: 0
2. FEELING DOWN, DEPRESSED OR HOPELESS: NOT AT ALL

## 2024-11-20 ASSESSMENT — COLUMBIA-SUICIDE SEVERITY RATING SCALE - C-SSRS
1. IN THE PAST MONTH, HAVE YOU WISHED YOU WERE DEAD OR WISHED YOU COULD GO TO SLEEP AND NOT WAKE UP?: NO
6. HAVE YOU EVER DONE ANYTHING, STARTED TO DO ANYTHING, OR PREPARED TO DO ANYTHING TO END YOUR LIFE?: NO
2. HAVE YOU ACTUALLY HAD ANY THOUGHTS OF KILLING YOURSELF?: NO

## 2024-11-20 ASSESSMENT — PAIN SCALES - GENERAL: PAINLEVEL_OUTOF10: 0-NO PAIN

## 2024-11-20 NOTE — PROGRESS NOTES
[facility] SLEEP MEDICINE   FOLLOW-UP VISIT NOTE      HISTORY OF PRESENT ILLNESS     Patient ID: Be Ortega is a 39 y.o. male who presents here for follow-up of NELY on INSPIRE and needing fine-tuning.    Patient is here alone today.  INSPIRE representative Barberton is present today to assist. To review, patient's medical history is notable for obesity (BMI 30), HTN, GERD, and NELY s/p INSPIRE implantation.    Interval History  Patient was last seen in 10/2/2024.   Since last visit, patient has been stepping up the INSPIRE amplitude and did not tolerate amplitude of 1.4 volts. Patient reports waking 2x last Thursday 11/14/2024 night; hence, he turned off INSPIRE device. Then on 11/15/2024 Friday morning, he developed slurring speech associated with tongue deviation to left which lasted for 30 minutes while talking to colleague. He denies concomitant facial or extremity weakness and numbness. He also denies problems with swallowing, eating, and drinking. Subsequently, he called INSPIRE hotline and amplitude was decreased to 1.1 volts 2 days ago. Currently on level 5 (~ incoming amplitude of 1.1 volts and patient control at 0.7 to 1.7 volts)  Per patient, he does not snore at night when using INSPIRE. Denies any issues with usage and was able to tolerate current amplitude settings of 1.1 volts. For the past 2 nights, he is able to sleep thru the night and wakes up rested in the morning. However, he has issues with focusing in daytime.     Reviewed clinic notes since activation in 6/28/2024:  6/28/2024 visit- INSPIRE was activated at the following settings: amplitude 1.2 volts, patient control 1.2 to 2.2 volts  8/23/2024 visit- Per patient, starting at 1.4 to 1.5 volts, he started sleeping better and sleeping thru the night. Patient reports no napping on first 3 weeks of activation then started napping 2x per week for 20 minutes. At 1.6 volts, his wife told him that does not snore anymore. He tried  1.9 volts but did not tolerate. He felt 1.9 volts was too strong as he was tossing and turning in bed at night. He is currently at 1.8 volts and noted that the tongue was rubbing the sides of his lower teeth. Plan was to make the following changes on Inspire settings: amplitude 1.4 volts, patient control 1.3 to 2.3 volts, start delay 45 mins  10/2/2024 visit- Per patient, he does not snore at night when using Inspire but was waking up every 45-60 minutes due to strong INSPIRE pulsation and starts to nap more frequently for longer period of time  (3-4 times per for 1 hour each nap) since last visit. He is not waking up rested in the morning. He complains that his tongue seemed to move far to the left and has been curling upward in the past 1-2 weeks (1.6 to 1.7 volts)     RLS Followup:   Denies    SLEEP STUDY HISTORY (personally reviewed raw data such as interpretation report, data sheet, hypnogram, and titration table if available and applicable)  Split-night PSG 4/21/2016: pre-PAP AHI 14.4, REM AHI 34.3, supine AHI 14.4, SpO2 tierney 89%. CPAP was started at 4-12 cm H2O. At CPAP 9 cm H2O with REM supine sleep, residual AHI was 6, SpO2 tierney 93%, but sleep efficiency was 74%. At CPAP 10 cm H2O with REM supine sleep, residual AHI was 6.3, SpO2 tierney 92%, and sleep efficiency was 100% BMI 28  PAP titration 9/24/2018: BPAP started at 10/6 to 14/8 cm H2O. AHI 22.5, SpO2 tierney 86%. Effective control of NELY not achieved at any BPAP setting.   PSG 9/7/2023: RDI3% 32 (supine RDI3% 51.6, non-supine RDI3% 9.3), RDI4% 19.7 (supine RDI4% 34.6, non-supine RDI4% 2.5) KAREN 0.8, SpO2 tierney 82%, central apneas 2.4%, Sleep efficiency 90.5%, TST 7.7 hrs.    SLEEP-WAKE SCHEDULE  Bedtime: 8:30 PM to 9 PM on workdays, 9-10 PM on off-workdays   Subjective sleep latency: less than 30 minutes  Difficulty falling asleep: No   Number of awakenings:  wakes up every hour due to gasping for air (1-2 times to go to bathroom) without NELY treatment.  Sleeps thru the night on INSPIRE after activation then as INSPIRE settings was uptitrated, patient was up every 45-60 minutes due to strong pulsations. 2 days ago, amplitude was lowered to 1.1 volts and since then patient was able to sleep thru the night.  Nocturia: Yes   Falls back asleep in 30 minutes  Final wake time: 3:30 AM on workdays, 6-7 AM on off-workdays   Out of bed time: 3:30 AM on workdays, 6-7 AM on off-workdays   Shift work: no. 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  Naps: 3x per week for 1-2 hours without NELY treatment, no napping on INSPIRE for the first 3 weeks but after that, he started napping 2x per week for 20 minutes until 8/23/2024 visit. Since last visit in Aug, he is napping more for about 3-4 times per week for 1 hour each nap. Since last visit in 10/2/2024, patient has been napping 2-3 times per week for 1-2 hours  Feels rested after a nap: No  Average sleep duration (excluding naps): 6 hours    SLEEP ENVIRONMENT  Sleep location: bed  Sleep status: sleeps alone while spouse sleeps in a different room  Preferred sleep position: left side and reclined    SOCIAL HISTORY  Smoking: no  ETOH: no  Marijuana: no  Caffeine: 1-2 cups coffee daily  Sleep aids: no    WEIGHT: stable    Claustrophobia: No       REVIEW OF SYSTEMS     All other systems have been reviewed and are negative.    ALLERGIES     No Known Allergies    MEDICATIONS     Current Outpatient Medications   Medication Sig Dispense Refill    nitroglycerin (Nitrostat) 0.4 mg SL tablet Place 1 tablet (0.4 mg) under the tongue every 5 minutes if needed for chest pain (ANGINA).      omeprazole (PriLOSEC) 40 mg DR capsule Take 1 capsule (40 mg) by mouth once daily. Do not crush or chew. 90 capsule 3    amLODIPine (Norvasc) 5 mg tablet Take 1 tablet (5 mg) by mouth once daily. 30 tablet 11     No current facility-administered medications for this visit.       Active Problems, Allergy  "List, Medication List, and PMH/PSH/FH/Social Hx have been reviewed and reconciled in chart. No significant changes unless documented in the pertinent chart section. Updates made when necessary.     PHYSICAL EXAM     VITAL SIGNS: /86   Pulse 93   Resp 16   SpO2 99%     NECK CIRCUMFERENCE: not measured    Today ESS: 14  Last visit ESS: 16  JAMAAL: 14    Physical Exam  Constitutional: Awake, not in distress  Head: normocephalic, atraumatic  Tongue:  Tongue motion was assessed and deemed appropriate. Tongue protrusion was moving past the incisors.   Skin: Warm, no rash. All incisions appear to be well healed and patient reports no lingering pain at any incision site.   Neuro: No tremors, moves all extremities  Psych: alert and oriented to time, place, and person    RESULTS/DATA     No results found for: \"IRON\", \"TRANSFERRIN\", \"IRONSAT\", \"TIBC\", \"FERRITIN\"    Bicarbonate   Date Value Ref Range Status   12/01/2023 25 21 - 32 mmol/L Final       PROCEDURE NOTES     Sleep study used for Inspire eligibility: diagnostic PSG 9/7/2023  Date of DISE: 1/22/2024 by Dr. Mueller   Date of Inspire implantation : 4/11/2024 by Dr. Mueller     Date of initial HNS activation: 6/28/2024  Date of INSPIRE titration study done: soon    Usage hours: 30 hours per week    INCOMING device settings:  Electrode configuration: +, -, +, off  Sensation threshold (ST): 0.6 volts  Functional threshold (FT): 0.9 volts  Incoming Amplitude: 1.1 volts  Patient control: 0.7 to 1.7 volts  Pulse width: 90 microseconds  Rate: 33 Hz  Start delay: 30 minutes  Pause delay: 15 minutes  Therapy Duration: 8 hours    Changes made at today's visit:  Electrode configuration: +, -, +, off  Sensation threshold (ST): 0.6 volts  Functional threshold (FT): 1.0 volts  Amplitude: 1.1 volts  Patient control: 0.7 to 1.7 volts  Pulse width: 90 microseconds  Rate: 33 Hz  Start delay: 30 minutes  Pause delay: 30 minutes  Therapy Duration: 8 hours    ASSESSMENT/PLAN     Be " DANII Ortega is a 39 y.o. male who returns in [facility] for the following problems:    OBSTRUCTIVE SLEEP APNEA, SEVERE positional (PSG RDI3% 32, RDI4% 19.7)  BMI 32.12  CPAP and BPAP intolerance due to mask and pressure intolerance  Failed oral appliance (NELY symptoms recurred at maixmum settings)  S/p placement of hypoglossal nerve stimulator (INSPIRE)  - The INSPIRE device was interrogated today. Patient is currently at incoming amplitude of 1.1 volts (Pt control 0.7 to 1.7 volts).  - Reviewed INSPIRE cloud report. Noted INSPIRE usage of 30 hours per week. Patient reports tolerating amplitudes well with improved NELY symptoms.  - Several amplitude settings (including incoming amplitude) were evaluated for acceptable tongue movement and comfort. Both sensation threshold (ST) and functional threshold (FT) were reassessed. See Procedure Notes above.  - The waveform was evaluated in upright and awake position. An adequate rise and fall was observed. Screenshot of waveform was taken. Patient was comfortable with the stimulation testing.  - Changes made today on INSPIRE settings. See Procedure Notes above.  - Advised patient to continue stepping up the therapy's stimulation level 1 level (~0.1 volts) up once per week if able to sleep thru the night every night for 1 week.   - Instructed patient to stop increasing levels (i.e. voltage) and possibly reduce to one level lower if there is any discomfort. Then resume stepping up the following 1-2 weeks at once every 1-2 week interval as tolerated.   - Instructed patient to stop increasing steps (i.e. voltage) if sleep quality is improved with reduction of snoring.  - Consider doing INSPIRE titration on next visit.  - Supportive management: Lose weight. Stay off your back when sleeping. Avoid smoking, alcohol, and sedating medications if applicable. Don't drive when sleepy.     EXCESSIVE DAYTIME SLEEPINESS with problems on focusing at tasks  -  likely due to inadequately treated NELY and circadian rhythm dysregulation  - Advised patient to use INSPIRE every night and anchor his wake time in the morning.     OBESITY  - Advised patient to lose weight.   - Defer management to PCP    HYPERTENSION  - BP stable today.  - Continue anti-hypertensive medications per PCP.  - Supportive management: low salt DASH diet (less than 2000 mg sodium intake daily), moderate intensity aerobic exercise at least 30 minutes 5 days per week, reduce stress, quit smoking, limit alcohol, lose weight, and monitor BP once daily  - PCP following. Defer management to PCP.    All of patient's questions were answered. He verbalizes understanding and agreement with my assessment and plan.    Follow-up in 4-6 weeks

## 2024-12-18 ENCOUNTER — OFFICE VISIT (OUTPATIENT)
Dept: SLEEP MEDICINE | Facility: CLINIC | Age: 39
End: 2024-12-18
Payer: COMMERCIAL

## 2024-12-18 ENCOUNTER — APPOINTMENT (OUTPATIENT)
Dept: OTOLARYNGOLOGY | Facility: CLINIC | Age: 39
End: 2024-12-18
Payer: COMMERCIAL

## 2024-12-18 VITALS
OXYGEN SATURATION: 97 % | HEART RATE: 84 BPM | DIASTOLIC BLOOD PRESSURE: 88 MMHG | SYSTOLIC BLOOD PRESSURE: 124 MMHG | RESPIRATION RATE: 16 BRPM

## 2024-12-18 VITALS — BODY MASS INDEX: 31.98 KG/M2 | WEIGHT: 199 LBS | HEIGHT: 66 IN | TEMPERATURE: 98.7 F

## 2024-12-18 DIAGNOSIS — J34.2 DEVIATED NASAL SEPTUM: ICD-10-CM

## 2024-12-18 DIAGNOSIS — G47.33 OBSTRUCTIVE SLEEP APNEA: Primary | ICD-10-CM

## 2024-12-18 DIAGNOSIS — I10 BENIGN ESSENTIAL HYPERTENSION: ICD-10-CM

## 2024-12-18 DIAGNOSIS — Z78.9 INTOLERANCE OF CONTINUOUS POSITIVE AIRWAY PRESSURE (CPAP) VENTILATION: ICD-10-CM

## 2024-12-18 DIAGNOSIS — G47.33 OSA (OBSTRUCTIVE SLEEP APNEA): Primary | ICD-10-CM

## 2024-12-18 DIAGNOSIS — Z96.82 S/P PLACEMENT OF HYPOGLOSSAL NERVE STIMULATOR: ICD-10-CM

## 2024-12-18 PROCEDURE — 1036F TOBACCO NON-USER: CPT | Performed by: OTOLARYNGOLOGY

## 2024-12-18 PROCEDURE — 99214 OFFICE O/P EST MOD 30 MIN: CPT | Performed by: INTERNAL MEDICINE

## 2024-12-18 PROCEDURE — 3008F BODY MASS INDEX DOCD: CPT | Performed by: OTOLARYNGOLOGY

## 2024-12-18 PROCEDURE — 3079F DIAST BP 80-89 MM HG: CPT | Performed by: INTERNAL MEDICINE

## 2024-12-18 PROCEDURE — 95977 ALYS CPLX CN NPGT PRGRMG: CPT | Performed by: INTERNAL MEDICINE

## 2024-12-18 PROCEDURE — 3074F SYST BP LT 130 MM HG: CPT | Performed by: INTERNAL MEDICINE

## 2024-12-18 PROCEDURE — 99214 OFFICE O/P EST MOD 30 MIN: CPT | Mod: 25 | Performed by: INTERNAL MEDICINE

## 2024-12-18 PROCEDURE — 99214 OFFICE O/P EST MOD 30 MIN: CPT | Performed by: OTOLARYNGOLOGY

## 2024-12-18 ASSESSMENT — SLEEP AND FATIGUE QUESTIONNAIRES
HOW LIKELY ARE YOU TO NOD OFF OR FALL ASLEEP WHILE SITTING QUIETLY AFTER LUNCH WITHOUT ALCOHOL: MODERATE CHANCE OF DOZING
HOW LIKELY ARE YOU TO NOD OFF OR FALL ASLEEP WHILE WATCHING TV: SLIGHT CHANCE OF DOZING
HOW LIKELY ARE YOU TO NOD OFF OR FALL ASLEEP WHILE SITTING AND READING: MODERATE CHANCE OF DOZING
ESS-CHAD TOTAL SCORE: 13
HOW LIKELY ARE YOU TO NOD OFF OR FALL ASLEEP WHILE LYING DOWN TO REST IN THE AFTERNOON WHEN CIRCUMSTANCES PERMIT: HIGH CHANCE OF DOZING
HOW LIKELY ARE YOU TO NOD OFF OR FALL ASLEEP IN A CAR, WHILE STOPPED FOR A FEW MINUTES IN TRAFFIC: SLIGHT CHANCE OF DOZING
HOW LIKELY ARE YOU TO NOD OFF OR FALL ASLEEP WHEN YOU ARE A PASSENGER IN A CAR FOR AN HOUR WITHOUT A BREAK: MODERATE CHANCE OF DOZING
HOW LIKELY ARE YOU TO NOD OFF OR FALL ASLEEP WHILE SITTING AND TALKING TO SOMEONE: SLIGHT CHANCE OF DOZING
SITING INACTIVE IN A PUBLIC PLACE LIKE A CLASS ROOM OR A MOVIE THEATER: SLIGHT CHANCE OF DOZING

## 2024-12-18 ASSESSMENT — PAIN SCALES - GENERAL: PAINLEVEL_OUTOF10: 0-NO PAIN

## 2024-12-18 NOTE — PROGRESS NOTES
[facility] SLEEP MEDICINE   FOLLOW-UP VISIT NOTE      HISTORY OF PRESENT ILLNESS     Patient ID: Be Ortega is a 39 y.o. male who presents here for follow-up of NELY on INSPIRE and needing fine-tuning.    Patient is here alone today.  INSPIRE representative [place] is present today to assist. To review, patient's medical history is notable for obesity (BMI 30), HTN, GERD, and NELY s/p INSPIRE implantation.     Interval History  Patient was last seen in 11/20/2024.   Since last visit, patient has been stepping up the INSPIRE amplitude and currently on level 6 (~ incoming amplitude of 1.2 volts and patient control at 0.7 to 1.7 volts)  Per patient, his wife told him that he still snores at 1.1 volts for 2 nights. Still have brain fog. Still wakes up 2-3 times per night for unknown reasons. Still sleepy in daytime. Denies any issues with usage and was able to tolerate different amplitude settings.   Has been sick recently; hence, not using much    Reviewed clinic notes since activation in 6/28/2024:  6/28/2024 visit- INSPIRE was activated at the following settings: amplitude 1.2 volts, patient control 1.2 to 2.2 volts  8/23/2024 visit- Per patient, starting at 1.4 to 1.5 volts, he started sleeping better and sleeping thru the night. Patient reports no napping on first 3 weeks of activation then started napping 2x per week for 20 minutes. At 1.6 volts, his wife told him that does not snore anymore. He tried 1.9 volts but did not tolerate. He felt 1.9 volts was too strong as he was tossing and turning in bed at night. He is currently at 1.8 volts and noted that the tongue was rubbing the sides of his lower teeth. Plan was to make the following changes on Inspire settings: amplitude 1.4 volts, patient control 1.3 to 2.3 volts, start delay 45 mins  10/2/2024 visit- Per patient, he does not snore at night when using Inspire but was waking up every 45-60 minutes due to strong INSPIRE pulsation and starts  to nap more frequently for longer period of time  (3-4 times per for 1 hour each nap) since last visit. He is not waking up rested in the morning. He complains that his tongue seemed to move far to the left and has been curling upward in the past 1-2 weeks (1.6 to 1.7 volts)     RLS Followup:   denies    SLEEP STUDY HISTORY (personally reviewed raw data such as interpretation report, data sheet, hypnogram, and titration table if available and applicable)  Split-night PSG 4/21/2016: pre-PAP AHI 14.4, REM AHI 34.3, supine AHI 14.4, SpO2 tierney 89%. CPAP was started at 4-12 cm H2O. At CPAP 9 cm H2O with REM supine sleep, residual AHI was 6, SpO2 tierney 93%, but sleep efficiency was 74%. At CPAP 10 cm H2O with REM supine sleep, residual AHI was 6.3, SpO2 tierney 92%, and sleep efficiency was 100% BMI 28  PAP titration 9/24/2018: BPAP started at 10/6 to 14/8 cm H2O. AHI 22.5, SpO2 tierney 86%. Effective control of NELY not achieved at any BPAP setting.   PSG 9/7/2023: RDI3% 32 (supine RDI3% 51.6, non-supine RDI3% 9.3), RDI4% 19.7 (supine RDI4% 34.6, non-supine RDI4% 2.5) KAREN 0.8, SpO2 tierney 82%, central apneas 2.4%, Sleep efficiency 90.5%, TST 7.7 hrs.    SLEEP-WAKE SCHEDULE  Bedtime: 8:30 PM to 9 PM on workdays, 9-10 PM on off-workdays   Subjective sleep latency: less than 30 minutes  Difficulty falling asleep: No   Number of awakenings:  wakes up every hour due to gasping for air (1-2 times to go to bathroom) without NELY treatment. Sleeps thru the night on INSPIRE after activation then as INSPIRE settings was uptitrated, patient was up every 45-60 minutes due to strong pulsations. Recently, wakes up 2-3 times per night for unknown reasons probably due to URTI  Falls back asleep in 30 minutes  Final wake time: 3:30 AM to 7 AM daily  Out of bed time: 3:30 AM to 7 AM daily  Shift work: no. 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  Naps: 3x per week for 1-2 hours without NELY  treatment, no napping on INSPIRE for the first 3 weeks but after that, he started napping 2x per week for 20 minutes until 8/23/2024 visit. Since last visit in Aug, he is napping more for about 3-4 times per week for 1 hour each nap. Since last visit in Nov 2024, patient was not napping during the first 2 weeks then he got sick with URTI and started napping here and there again.  Feels rested after a nap: No  Average sleep duration (excluding naps): 6 hours    SLEEP ENVIRONMENT  Sleep location: bed  Sleep status: sleeps alone while spouse sleeps in a different room  Preferred sleep position: left side and reclined    SOCIAL HISTORY  Smoking: no  ETOH: no  Marijuana: no  Caffeine: 1-2 cups coffee daily  Sleep aids: no    WEIGHT: stable    Claustrophobia: No       REVIEW OF SYSTEMS     All other systems have been reviewed and are negative.    ALLERGIES     No Known Allergies    MEDICATIONS     Current Outpatient Medications   Medication Sig Dispense Refill    nitroglycerin (Nitrostat) 0.4 mg SL tablet Place 1 tablet (0.4 mg) under the tongue every 5 minutes if needed for chest pain (ANGINA).      amLODIPine (Norvasc) 5 mg tablet Take 1 tablet (5 mg) by mouth once daily. 30 tablet 11    omeprazole (PriLOSEC) 40 mg DR capsule Take 1 capsule (40 mg) by mouth once daily. Do not crush or chew. 90 capsule 3     No current facility-administered medications for this visit.       Active Problems, Allergy List, Medication List, and PMH/PSH/FH/Social Hx have been reviewed and reconciled in chart. No significant changes unless documented in the pertinent chart section. Updates made when necessary.     PHYSICAL EXAM     VITAL SIGNS: /88   Pulse 84   Resp 16   SpO2 97%     NECK CIRCUMFERENCE: not emasured    Today ESS: 13  Last visit ESS: 14  JAMAAL: 14    Physical Exam  Constitutional: Awake, not in distress  Head: normocephalic, atraumatic  Tongue:  Tongue motion was assessed and deemed appropriate.   Skin: Warm, no rash.  "All incisions appear to be well healed and patient reports no lingering pain at any incision site.   Neuro: No tremors, moves all extremities  Psych: alert and oriented to time, place, and person    RESULTS/DATA     No results found for: \"IRON\", \"TRANSFERRIN\", \"IRONSAT\", \"TIBC\", \"FERRITIN\"    Bicarbonate   Date Value Ref Range Status   12/01/2023 25 21 - 32 mmol/L Final       PROCEDURE NOTES     Sleep study used for Inspire eligibility: diagnostic PSG 9/7/2023  Date of DISE: 1/22/2024 by Dr. Mueller   Date of Inspire implantation : 4/11/2024 by Dr. Mueller     Date of initial HNS activation: 6/28/2024  Date of INSPIRE titration study done: soon    Usage hours: 42 hours per week    INCOMING device settings:  Electrode configuration: +, -, +, off  Sensation threshold (ST): 0.6 volts  Functional threshold (FT): 1.0 volts  Incoming Amplitude: 1.2 volts  Patient control: 0.7 to 1.7 volts  Pulse width: 90 microseconds  Rate: 33 Hz  Start delay: 30 minutes  Pause delay: 30 minutes  Therapy Duration: 8 hours    Changes made at today's visit:  Electrode configuration: +, -, +, off  Sensation threshold (ST): 0.5 volts  Functional threshold (FT): 0.6 volts  Amplitude: 1.1 volts  Patient control: 0.7 to 1.7 volts  Pulse width: 90 microseconds  Rate: 33 Hz  Start delay: 30 minutes  Pause delay: 30 minutes  Therapy Duration: 8 hours    ASSESSMENT/PLAN     Be Ortega is a 39 y.o. male who returns in [facility] for the following problems:    OBSTRUCTIVE SLEEP APNEA, SEVERE positional (PSG RDI3% 32, RDI4% 19.7)  BMI 32.12  CPAP and BPAP intolerance due to mask and pressure intolerance  Failed oral appliance (NELY symptoms recurred at maixmum settings)  S/p placement of hypoglossal nerve stimulator (INSPIRE)  - The INSPIRE device was interrogated today. Patient is currently at incoming amplitude of 1.2 volts (Pt control 0.7 to 1.7 volts).  - Reviewed INSPIRE cloud report. Noted INSPIRE usage of 42 " hours per week. Patient reports tolerating amplitudes well with improved NELY symptoms.  - Several amplitude settings (including incoming amplitude) were evaluated for acceptable tongue movement and comfort. Both sensation threshold (ST) and functional threshold (FT) were reassessed. See Procedure Notes above.  - The waveform was evaluated in upright and awake position. An adequate rise and fall was observed. Screenshot of waveform was taken. Patient was comfortable with the stimulation testing.  - Changes made today on INSPIRE settings. See Procedure Notes above.  - Advised patient to continue stepping up the therapy's stimulation level 1 level (~0.1 volts) up once per week as tolerated.   - Instructed patient to stop increasing levels (i.e. voltage) and possibly reduce to one level lower if there is any discomfort. Then resume stepping up the following 1-2 weeks at once every 1-2 week interval as tolerated.   - Instructed patient to stop increasing steps (i.e. voltage) if sleep quality is improved with reduction of snoring.  - Supportive management: Lose weight. Stay off your back when sleeping. Avoid smoking, alcohol, and sedating medications if applicable. Don't drive when sleepy.     EXCESSIVE DAYTIME SLEEPINESS with problems on focusing at tasks  - likely due to inadequately treated NELY and circadian rhythm dysregulation     OBESITY  - Advised patient to lose weight.   - Defer management to PCP    HYPERTENSION  - BP stable today.  - Continue anti-hypertensive medications per PCP.  - Supportive management: low salt DASH diet (less than 2000 mg sodium intake daily), moderate intensity aerobic exercise at least 30 minutes 5 days per week, reduce stress, quit smoking, limit alcohol, lose weight, and monitor BP once daily  - PCP following. Defer management to PCP.    All of patient's questions were answered. He verbalizes understanding and agreement with my assessment and plan.    Follow-up in 4-6 weeks

## 2024-12-18 NOTE — PROGRESS NOTES
Chief Complaint   Patient presents with    Follow-up     LOV 6/24 STRUGGLING WITH INSPIRE, SEES [DR BHAT TODAY      Date of Evaluation: 12/18/2024   HPI  He returns status post inspire implantation for obstructive sleep apnea on 4/11/2024.  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  He is still titrating.  There were times that he reports that he was being overstimulated and this may have been as the nerves was recovering fully.  He is still going through a slower titration process now. Be Ortega is a 39 y.o. male seen in consultation at the request of Dr. Bhat for evaluation for inspire.  He is a  who has a several year history of obstructive sleep apnea.  He has been tried on CPAP BiPAP and dental appliance without continued success.  He winds up ripping it off throughout the night.  His most recent sleep study was September 2023 that showed an RDI 4% of 19.7 with minimal central apnea.  His oxygen tierney was 82%.  He does have hypertension and a family history of coronary artery disease.  He is following with cardiology and being worked up.  He has daytime tiredness and very much wants his sleep apnea under control       Past Medical History:   Diagnosis Date    Angina pectoris     Asthma     Cutaneous abscess of buttock 01/27/2016    Abscess of buttock    GERD (gastroesophageal reflux disease)     Hypercholesteremia     Hypertension     Myocardial infarction (Multi)     Other chest pain 06/09/2020    Atypical chest pain    Other muscle spasm 09/16/2015    Trapezius muscle spasm    Other specified diseases of anus and rectum 01/26/2016    Rectal pain    Sleep apnea       Past Surgical History:   Procedure Laterality Date    CARDIAC CATHETERIZATION  2020    Non-critical CAD    CARDIOVASCULAR STRESS TEST      12/2023    OTHER SURGICAL HISTORY  2024    Drug induced sleep endoscopy    OTHER SURGICAL HISTORY      INSPIRE    WISDOM TOOTH EXTRACTION       "      Medications:   Current Outpatient Medications   Medication Instructions    amLODIPine (NORVASC) 5 mg, oral, Daily    nitroglycerin (NITROSTAT) 0.4 mg, Every 5 min PRN    omeprazole (PRILOSEC) 40 mg, oral, Daily, Do not crush or chew.        Allergies:  No Known Allergies     Physical Exam:  Last Recorded Vitals  Temperature 37.1 °C (98.7 °F), height 1.676 m (5' 6\"), weight 90.3 kg (199 lb).  []General appearance: Well-developed, well-nourished in no acute distress, conversant with normal voice quality    Head/face: No erythema or edema or facial tenderness, and normal facial nerve function bilaterally    External ear: Clear external auditory canals with normal pinnae  Tube status: N/A  Middle ear: Tympanic membranes intact and mobile, middle ears normal.  Tympanic membrane perforation: N/A  Mastoid bowl: N/A  Hearing: Normal conversational awareness at normal speech thresholds    Nose visualized using: Anterior rhinoscopy  Nasal dorsum: Nontraumatic midline appearance  Septum: Deviated left  Inferior turbinates: Normal, pink, hypertrophy  Secretions: Dry    Oral cavity and oropharynx: Normal  Teeth: Good condition  Floor of mouth: without lesions  Palate: Normal hard palate, soft palate and uvula  Oropharynx: Clear, no lesions present.  The tongue mobility is 100% normal  Buccal mucosa: Normal without masses or lesions  Lips: Normal    Nasopharynx: Inadequate mirror exam secondary to gag/anatomy    Neck:  Salivary glands: Normal bilateral parotid and submandibular glands by inspection and palpation.  Non-thyroid masses: No palpable masses or significant lymphadenopathy  Trachea: Midline  Thyroid: No thyromegaly or palpable nodules  Temporomandibular joint: Nontender  Cervical range of motion: Normal    Neurologic exam: Alert and oriented x3, appropriate affect.  Cranial nerves II-XII normal bilaterally  Extraocular movement: Extraocular movement intact, normal gaze alignment        Be was seen today " for follow-up.  Diagnoses and all orders for this visit:  Obstructive sleep apnea (Primary)  BMI 30.0-30.9,adult  Deviated nasal septum               PLAN  He will continue to work with Dr. Barrientos for titration.  I will remain available for awake endoscopy if necessary.  Recheck in 6 months.  He also has some hearing complaints and may follow-up sooner with an audiogram  Jem Mueller MD

## 2024-12-24 PROBLEM — L23.7 CONTACT DERMATITIS DUE TO POISON IVY: Status: RESOLVED | Noted: 2023-11-12 | Resolved: 2024-12-24

## 2024-12-24 PROBLEM — J20.9 ACUTE BRONCHITIS: Status: RESOLVED | Noted: 2024-12-24 | Resolved: 2024-12-24

## 2024-12-24 PROBLEM — U07.1 DISEASE DUE TO SEVERE ACUTE RESPIRATORY SYNDROME CORONAVIRUS 2 (SARS-COV-2): Status: RESOLVED | Noted: 2024-12-24 | Resolved: 2024-12-24

## 2024-12-24 PROBLEM — S09.93XA DENTAL TRAUMA: Status: RESOLVED | Noted: 2024-04-11 | Resolved: 2024-12-24

## 2024-12-24 PROBLEM — S83.90XA SPRAIN OF KNEE: Status: ACTIVE | Noted: 2024-12-24

## 2024-12-24 PROBLEM — S05.02XA LEFT CORNEAL ABRASION: Status: RESOLVED | Noted: 2023-11-12 | Resolved: 2024-12-24

## 2025-01-02 ENCOUNTER — LAB (OUTPATIENT)
Dept: LAB | Facility: LAB | Age: 40
End: 2025-01-02
Payer: COMMERCIAL

## 2025-01-02 ENCOUNTER — APPOINTMENT (OUTPATIENT)
Dept: PRIMARY CARE | Facility: CLINIC | Age: 40
End: 2025-01-02
Payer: COMMERCIAL

## 2025-01-02 VITALS
DIASTOLIC BLOOD PRESSURE: 82 MMHG | SYSTOLIC BLOOD PRESSURE: 118 MMHG | TEMPERATURE: 97.6 F | HEIGHT: 66 IN | OXYGEN SATURATION: 97 % | WEIGHT: 201 LBS | BODY MASS INDEX: 32.3 KG/M2 | HEART RATE: 88 BPM

## 2025-01-02 DIAGNOSIS — E78.00 HYPERCHOLESTEROLEMIA: ICD-10-CM

## 2025-01-02 DIAGNOSIS — E29.1 HYPOGONADISM IN MALE: ICD-10-CM

## 2025-01-02 DIAGNOSIS — G47.33 OSA (OBSTRUCTIVE SLEEP APNEA): ICD-10-CM

## 2025-01-02 DIAGNOSIS — R53.83 FATIGUE, UNSPECIFIED TYPE: ICD-10-CM

## 2025-01-02 DIAGNOSIS — R74.8 ELEVATED LIVER ENZYMES: ICD-10-CM

## 2025-01-02 DIAGNOSIS — G47.19 EXCESSIVE DAYTIME SLEEPINESS: Primary | ICD-10-CM

## 2025-01-02 DIAGNOSIS — E53.8 VITAMIN B12 DEFICIENCY: ICD-10-CM

## 2025-01-02 DIAGNOSIS — G47.19 EXCESSIVE DAYTIME SLEEPINESS: ICD-10-CM

## 2025-01-02 DIAGNOSIS — I10 ESSENTIAL HYPERTENSION: ICD-10-CM

## 2025-01-02 LAB
ALBUMIN SERPL BCP-MCNC: 4.9 G/DL (ref 3.4–5)
ALP SERPL-CCNC: 57 U/L (ref 33–120)
ALT SERPL W P-5'-P-CCNC: 83 U/L (ref 10–52)
ANION GAP SERPL CALC-SCNC: 12 MMOL/L (ref 10–20)
AST SERPL W P-5'-P-CCNC: 33 U/L (ref 9–39)
BASOPHILS # BLD AUTO: 0.03 X10*3/UL (ref 0–0.1)
BASOPHILS NFR BLD AUTO: 0.6 %
BILIRUB SERPL-MCNC: 0.8 MG/DL (ref 0–1.2)
BUN SERPL-MCNC: 16 MG/DL (ref 6–23)
CALCIUM SERPL-MCNC: 9.1 MG/DL (ref 8.6–10.3)
CHLORIDE SERPL-SCNC: 104 MMOL/L (ref 98–107)
CO2 SERPL-SCNC: 26 MMOL/L (ref 21–32)
CREAT SERPL-MCNC: 0.98 MG/DL (ref 0.5–1.3)
EGFRCR SERPLBLD CKD-EPI 2021: >90 ML/MIN/1.73M*2
EOSINOPHIL # BLD AUTO: 0.33 X10*3/UL (ref 0–0.7)
EOSINOPHIL NFR BLD AUTO: 6.6 %
ERYTHROCYTE [DISTWIDTH] IN BLOOD BY AUTOMATED COUNT: 12.6 % (ref 11.5–14.5)
GLUCOSE SERPL-MCNC: 84 MG/DL (ref 74–99)
HCT VFR BLD AUTO: 46.1 % (ref 41–52)
HGB BLD-MCNC: 16.1 G/DL (ref 13.5–17.5)
IMM GRANULOCYTES # BLD AUTO: 0.04 X10*3/UL (ref 0–0.7)
IMM GRANULOCYTES NFR BLD AUTO: 0.8 % (ref 0–0.9)
LYMPHOCYTES # BLD AUTO: 1.17 X10*3/UL (ref 1.2–4.8)
LYMPHOCYTES NFR BLD AUTO: 23.4 %
MCH RBC QN AUTO: 29.5 PG (ref 26–34)
MCHC RBC AUTO-ENTMCNC: 34.9 G/DL (ref 32–36)
MCV RBC AUTO: 84 FL (ref 80–100)
MONOCYTES # BLD AUTO: 0.47 X10*3/UL (ref 0.1–1)
MONOCYTES NFR BLD AUTO: 9.4 %
NEUTROPHILS # BLD AUTO: 2.96 X10*3/UL (ref 1.2–7.7)
NEUTROPHILS NFR BLD AUTO: 59.2 %
NRBC BLD-RTO: 0 /100 WBCS (ref 0–0)
PLATELET # BLD AUTO: 197 X10*3/UL (ref 150–450)
POTASSIUM SERPL-SCNC: 4.1 MMOL/L (ref 3.5–5.3)
PROT SERPL-MCNC: 7.1 G/DL (ref 6.4–8.2)
RBC # BLD AUTO: 5.46 X10*6/UL (ref 4.5–5.9)
SODIUM SERPL-SCNC: 138 MMOL/L (ref 136–145)
T4 FREE SERPL-MCNC: 0.85 NG/DL (ref 0.61–1.12)
TSH SERPL-ACNC: 0.7 MIU/L (ref 0.44–3.98)
VIT B12 SERPL-MCNC: 531 PG/ML (ref 211–911)
WBC # BLD AUTO: 5 X10*3/UL (ref 4.4–11.3)

## 2025-01-02 PROCEDURE — 85025 COMPLETE CBC W/AUTO DIFF WBC: CPT

## 2025-01-02 PROCEDURE — 36415 COLL VENOUS BLD VENIPUNCTURE: CPT

## 2025-01-02 PROCEDURE — 1036F TOBACCO NON-USER: CPT | Performed by: FAMILY MEDICINE

## 2025-01-02 PROCEDURE — 99214 OFFICE O/P EST MOD 30 MIN: CPT | Performed by: FAMILY MEDICINE

## 2025-01-02 PROCEDURE — 3079F DIAST BP 80-89 MM HG: CPT | Performed by: FAMILY MEDICINE

## 2025-01-02 PROCEDURE — 84439 ASSAY OF FREE THYROXINE: CPT

## 2025-01-02 PROCEDURE — 3008F BODY MASS INDEX DOCD: CPT | Performed by: FAMILY MEDICINE

## 2025-01-02 PROCEDURE — 84402 ASSAY OF FREE TESTOSTERONE: CPT

## 2025-01-02 PROCEDURE — 3074F SYST BP LT 130 MM HG: CPT | Performed by: FAMILY MEDICINE

## 2025-01-02 PROCEDURE — 80053 COMPREHEN METABOLIC PANEL: CPT

## 2025-01-02 PROCEDURE — 84443 ASSAY THYROID STIM HORMONE: CPT

## 2025-01-02 PROCEDURE — 82607 VITAMIN B-12: CPT

## 2025-01-02 RX ORDER — BUPROPION HYDROCHLORIDE 150 MG/1
150 TABLET, EXTENDED RELEASE ORAL 2 TIMES DAILY
Qty: 60 TABLET | Refills: 2 | Status: SHIPPED | OUTPATIENT
Start: 2025-01-02 | End: 2025-04-02

## 2025-01-02 ASSESSMENT — ENCOUNTER SYMPTOMS
VOMITING: 0
UNEXPECTED WEIGHT CHANGE: 0
CONSTIPATION: 0
PALPITATIONS: 0

## 2025-01-02 NOTE — PROGRESS NOTES
"Subjective   Patient ID: Be Ortega is a 39 y.o. male who presents for Follow-up (Be is here today with c/o of focus, memory, and brain fog issues that have been worsening. Pt would like to discuss with doctor. ).    HPI   Very fatigued and has missed a lot of sleep due to not tolerating CPAP and issues with his inspire device  Feeling very fatigued with trouble focusing and feeling sad  Affecting home and work life    Review of Systems   Constitutional:  Negative for unexpected weight change.   HENT:  Negative for congestion and ear discharge.    Cardiovascular:  Negative for chest pain and palpitations.   Gastrointestinal:  Negative for constipation and vomiting.   All other systems reviewed and are negative.      Objective   /82 (BP Location: Left arm, Patient Position: Sitting)   Pulse 88   Temp 36.4 °C (97.6 °F) (Temporal)   Ht 1.676 m (5' 6\")   Wt 91.2 kg (201 lb)   SpO2 97%   BMI 32.44 kg/m²     Physical Exam  HENT:      Head: Normocephalic and atraumatic.      Nose: Nose normal.      Mouth/Throat:      Mouth: Mucous membranes are moist.      Pharynx: No oropharyngeal exudate.   Eyes:      Extraocular Movements: Extraocular movements intact.      Conjunctiva/sclera: Conjunctivae normal.      Pupils: Pupils are equal, round, and reactive to light.   Cardiovascular:      Rate and Rhythm: Normal rate and regular rhythm.   Pulmonary:      Effort: Pulmonary effort is normal.      Breath sounds: Normal breath sounds.   Abdominal:      General: There is no distension.      Palpations: Abdomen is soft.   Musculoskeletal:      Cervical back: Normal range of motion and neck supple.   Lymphadenopathy:      Cervical: No cervical adenopathy.   Neurological:      General: No focal deficit present.      Mental Status: He is alert.   Psychiatric:         Attention and Perception: Attention normal.         Speech: Speech normal.         Behavior: Behavior is cooperative. "         Assessment/Plan   Problem List Items Addressed This Visit             ICD-10-CM    Elevated liver enzymes R74.8    Relevant Orders    CBC and Auto Differential (Completed)    Comprehensive Metabolic Panel    Vitamin B12    Thyroid Stimulating Hormone    Thyroxine, Free    Excessive daytime sleepiness - Primary G47.19    Relevant Orders    CBC and Auto Differential (Completed)    Comprehensive Metabolic Panel    Vitamin B12    Thyroid Stimulating Hormone    Thyroxine, Free    Hypercholesterolemia E78.00    Relevant Orders    CBC and Auto Differential (Completed)    Comprehensive Metabolic Panel    Vitamin B12    Thyroid Stimulating Hormone    Thyroxine, Free    NELY (obstructive sleep apnea) G47.33    Relevant Orders    CBC and Auto Differential (Completed)    Comprehensive Metabolic Panel    Vitamin B12    Thyroid Stimulating Hormone    Thyroxine, Free    Essential hypertension I10     Controlled          Other Visit Diagnoses         Codes    Fatigue, unspecified type     R53.83    Relevant Medications    buPROPion SR (Wellbutrin SR) 150 mg 12 hr tablet    Other Relevant Orders    CBC and Auto Differential (Completed)    Comprehensive Metabolic Panel    Vitamin B12    Thyroid Stimulating Hormone    Thyroxine, Free    Vitamin B12 deficiency     E53.8    Relevant Orders    CBC and Auto Differential (Completed)    Comprehensive Metabolic Panel    Vitamin B12    Thyroid Stimulating Hormone    Thyroxine, Free    Hypogonadism in male     E29.1    Relevant Orders    Testosterone,Free and Total        Risk benefits discussed and add medication as directed   SHIMON discussed  Recheck 1 month sooner if any issues arise

## 2025-01-03 ENCOUNTER — TELEPHONE (OUTPATIENT)
Dept: PRIMARY CARE | Facility: CLINIC | Age: 40
End: 2025-01-03
Payer: COMMERCIAL

## 2025-01-03 NOTE — TELEPHONE ENCOUNTER
----- Message from Blas Garcia sent at 1/3/2025  2:09 PM EST -----  Sugar electrolytes kidney tests all look good  Liver test is slightly elevated but it is stable over the last several years and improved  White cells red cells and platelets are all normal as is the B12 level and thyroid levels  Testosterone is pending at this time

## 2025-01-08 LAB
TESTOSTERONE FREE (CHAN): 77.4 PG/ML (ref 35–155)
TESTOSTERONE,TOTAL,LC-MS/MS: 414 NG/DL (ref 250–1100)

## 2025-01-24 DIAGNOSIS — E29.1 HYPOGONADISM IN MALE: Primary | ICD-10-CM

## 2025-01-24 DIAGNOSIS — R53.83 FATIGUE, UNSPECIFIED TYPE: ICD-10-CM

## 2025-01-24 RX ORDER — BUPROPION HYDROCHLORIDE 150 MG/1
150 TABLET, EXTENDED RELEASE ORAL 2 TIMES DAILY
Qty: 180 TABLET | Refills: 1 | Status: SHIPPED | OUTPATIENT
Start: 2025-01-24

## 2025-01-24 RX ORDER — TESTOSTERONE CYPIONATE 1000 MG/10ML
200 INJECTION, SOLUTION INTRAMUSCULAR
Qty: 4 ML | Refills: 1 | Status: SHIPPED | OUTPATIENT
Start: 2025-01-24 | End: 2025-03-21

## 2025-01-27 ENCOUNTER — CLINICAL SUPPORT (OUTPATIENT)
Dept: PRIMARY CARE | Facility: CLINIC | Age: 40
End: 2025-01-27
Payer: COMMERCIAL

## 2025-01-27 ENCOUNTER — TELEPHONE (OUTPATIENT)
Dept: PRIMARY CARE | Facility: CLINIC | Age: 40
End: 2025-01-27

## 2025-01-27 DIAGNOSIS — Z79.899 MEDICATION MANAGEMENT: ICD-10-CM

## 2025-01-27 NOTE — TELEPHONE ENCOUNTER
Pt was in the office today and was leaving a urine sample and signing a CSA for testosterone medication.      Pt stated that he and his wife are considering have another child and would the medication have an effect on that?

## 2025-01-31 ENCOUNTER — TELEPHONE (OUTPATIENT)
Dept: PRIMARY CARE | Facility: CLINIC | Age: 40
End: 2025-01-31
Payer: COMMERCIAL

## 2025-01-31 LAB
1OH-MIDAZOLAM UR-MCNC: NEGATIVE NG/ML
7AMINOCLONAZEPAM UR-MCNC: NEGATIVE NG/ML
A-OH ALPRAZ UR-MCNC: NEGATIVE NG/ML
A-OH-TRIAZOLAM UR-MCNC: NEGATIVE NG/ML
AMPHETAMINES UR QL: NEGATIVE NG/ML
BARBITURATES UR QL: NEGATIVE NG/ML
BZE UR QL: NEGATIVE NG/ML
CODEINE UR-MCNC: NEGATIVE NG/ML
CREAT UR-MCNC: 173.4 MG/DL
DRUG SCREEN COMMENT UR-IMP: NORMAL
EDDP UR-MCNC: NEGATIVE NG/ML
FENTANYL UR-MCNC: NEGATIVE NG/ML
HYDROCODONE UR-MCNC: NEGATIVE NG/ML
HYDROMORPHONE UR-MCNC: NEGATIVE NG/ML
LORAZEPAM UR-MCNC: NEGATIVE NG/ML
METHADONE UR-MCNC: NEGATIVE NG/ML
MORPHINE UR-MCNC: NEGATIVE NG/ML
NORDIAZEPAM UR-MCNC: NEGATIVE NG/ML
NORFENTANYL UR-MCNC: NEGATIVE NG/ML
NORHYDROCODONE UR CFM-MCNC: NEGATIVE NG/ML
NOROXYCODONE UR CFM-MCNC: NEGATIVE NG/ML
NORTRAMADOL UR-MCNC: NEGATIVE NG/ML
OH-ETHYLFLURAZ UR-MCNC: NEGATIVE NG/ML
OXAZEPAM UR-MCNC: NEGATIVE NG/ML
OXIDANTS UR QL: NEGATIVE MCG/ML
OXYCODONE UR CFM-MCNC: NEGATIVE NG/ML
OXYMORPHONE UR CFM-MCNC: NEGATIVE NG/ML
PCP UR QL: NEGATIVE NG/ML
PH UR: 6.9 [PH] (ref 4.5–9)
QUEST 6 ACETYLMORPHINE: NEGATIVE NG/ML
QUEST NOTES AND COMMENTS: NORMAL
QUEST ZOLPIDEM: NEGATIVE NG/ML
TEMAZEPAM UR-MCNC: NEGATIVE NG/ML
THC UR QL: NEGATIVE NG/ML
TRAMADOL UR-MCNC: NEGATIVE NG/ML
ZOLPIDEM PHENYL-4-CARB UR CFM-MCNC: NEGATIVE NG/ML

## 2025-01-31 NOTE — TELEPHONE ENCOUNTER
Spoke with pt in regards to prior authorization.  Tried to resubmit the prior authorization and still comes back as insurance coverage terminated.  Informed pt that he would need to call his insurance company and straighten out the issue.  Pt said he spoke with him the other day and they were suppose to fax paperwork over with all the information.  Pt stated that he will call them again.

## 2025-02-03 ENCOUNTER — APPOINTMENT (OUTPATIENT)
Dept: OTOLARYNGOLOGY | Facility: CLINIC | Age: 40
End: 2025-02-03
Payer: COMMERCIAL

## 2025-02-03 ENCOUNTER — APPOINTMENT (OUTPATIENT)
Dept: AUDIOLOGY | Facility: CLINIC | Age: 40
End: 2025-02-03
Payer: COMMERCIAL

## 2025-02-03 VITALS — BODY MASS INDEX: 32.14 KG/M2 | TEMPERATURE: 98.4 F | HEIGHT: 66 IN | WEIGHT: 200 LBS

## 2025-02-03 DIAGNOSIS — H90.42 SENSORINEURAL HEARING LOSS (SNHL) OF LEFT EAR WITH UNRESTRICTED HEARING OF RIGHT EAR: Primary | ICD-10-CM

## 2025-02-03 DIAGNOSIS — H90.3 ASYMMETRIC SENSORINEURAL DEAFNESS: ICD-10-CM

## 2025-02-03 DIAGNOSIS — J34.2 DEVIATED NASAL SEPTUM: ICD-10-CM

## 2025-02-03 DIAGNOSIS — G47.33 OBSTRUCTIVE SLEEP APNEA: Primary | ICD-10-CM

## 2025-02-03 PROCEDURE — 92550 TYMPANOMETRY & REFLEX THRESH: CPT | Performed by: AUDIOLOGIST

## 2025-02-03 PROCEDURE — 92557 COMPREHENSIVE HEARING TEST: CPT | Performed by: AUDIOLOGIST

## 2025-02-03 PROCEDURE — 1036F TOBACCO NON-USER: CPT | Performed by: OTOLARYNGOLOGY

## 2025-02-03 PROCEDURE — 99213 OFFICE O/P EST LOW 20 MIN: CPT | Performed by: OTOLARYNGOLOGY

## 2025-02-03 PROCEDURE — 3008F BODY MASS INDEX DOCD: CPT | Performed by: OTOLARYNGOLOGY

## 2025-02-03 NOTE — PROGRESS NOTES
AUDIOLOGY ADULT AUDIOMETRIC EVALUATION    Name:  Be Ortega  :  1985  Age:  39 y.o.  Date of Evaluation:  February 3, 2025    Reason for visit: Mr. Ortega is seen in the clinic today at the request of otolaryngology for an audiologic evaluation.     HISTORY  Patient is here today for a hearing evaluation and also to see Dr. Mueller for Inspire. He denies tinnitus, dizzy and fullness and has occupational noise exposure with his work as a .      EVALUATION  See scanned audiogram: “Media” > “Audiology Report”.      RESULTS  Otoscopic Evaluation:  Right Ear: clear ear canal  Left Ear: clear ear canal    Immittance Measures:  Tympanometry:  Right Ear: Type A, normal tympanic membrane mobility with normal middle ear pressure   Left Ear: Type A, normal tympanic membrane mobility with normal middle ear pressure     Acoustic Reflexes:  Ipsilateral Right Ear: 95 95 95 100   Ipsilateral Left Ear:  100 100 100 100   Contralateral Right Ear: did not evaluate  Contralateral Left Ear: did not evaluate    Distortion Product Otoacoustic Emissions (DPOAEs):  Right Ear: PASS: 1-6 KHZ  REFER: 8 K HZ  Left Ear:   PASS: 1.5 K TO 4 K KHZ   REFER: 1, 5-8 K HZ    Audiometry:  Test Technique and Reliability: BEHAVIORAL   Standard audiometry via supra-aural headphones. Reliability is good.    Pure tone air and bone conduction audiometry:  Right Ear: WITHIN NORMAL LIMITS 125- 8 K HZ.  Left Ear:    WITHIN NORMAL LIMTS TO 3 K HZ WITH A MILD SNHL  AT 4-8 K HZ - ASYMMETRIC .    Speech Audiometry (Word Recognition Scores):   Right Ear:  96% GOOD  Left Ear:    96% GOOD    IMPRESSIONS  MILD LEFT ASYMMETRIC SNHL AT 4-8 K HZ WITH NORMAL HEARING RIGHT AND NO TINNITUS, DIZZY OR FULLNESS.   The presence of acoustic reflexes within normal intensity limits is consistent with normal middle ear and brainstem function, and suggests that auditory sensitivity is not significantly impaired. An elevated or absent acoustic reflex  threshold is consistent with a middle ear disorder, hearing loss in the stimulated ear, and/or interruption of neural innervation of the stapedius muscle. Present DPOAEs suggest normal/near normal cochlear outer hair cell function and are consistent with no greater than a mild hearing loss at those frequencies. Absent DPOAEs are consistent with abnormal cochlear outer hair cell function and some degree of hearing loss at those frequencies.    RECOMMENDATIONS  - Follow up with otolaryngology today as scheduled.SLIGHT LEFT ASYMMETRY   - Audiologic evaluation as needed.  - Annual audiologic evaluation, sooner if an acute change is noted.  -WEAR HPD IN NOISE.  - Audiologic evaluation in conjunction with otologic care, if an acute change is noted, and/or annually.  - Follow-up with medical care team as planned.    PATIENT EDUCATION  Discussed results, impressions and recommendations with the patient. Questions were addressed and the patient was encouraged to contact our office should concerns arise.    Time for this encounter:35 MINUTES     Dayna Davis  Licensed Audiologist   37

## 2025-02-03 NOTE — PROGRESS NOTES
Chief Complaint   Patient presents with    Follow-up     6 WK F/U HEARING AND INSPIRE      Date of Evaluation: 2/3/2025   HPI  He was making progress with inspire that got delayed because he was switched to night shift.  He will switch back to days and continue his titration with Dr. Barrientos.  He is here for his hearing complaints as well.  His audiogram shows a slight asymmetry with the left ear worse than right in the mid to high frequencies.  I believe this is consistent with his noise exposure pattern as a     He returns status post inspire implantation for obstructive sleep apnea on 4/11/2024.  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  He is still titrating.  There were times that he reports that he was being overstimulated and this may have been as the nerves was recovering fully.  He is still going through a slower titration process now. Be Ortega is a 39 y.o. male seen in consultation at the request of Dr. Barrientos for evaluation for inspire.  He is a  who has a several year history of obstructive sleep apnea.  He has been tried on CPAP BiPAP and dental appliance without continued success.  He winds up ripping it off throughout the night.  His most recent sleep study was September 2023 that showed an RDI 4% of 19.7 with minimal central apnea.  His oxygen tierney was 82%.  He does have hypertension and a family history of coronary artery disease.  He is following with cardiology and being worked up.  He has daytime tiredness and very much wants his sleep apnea under control       Past Medical History:   Diagnosis Date    Acute bronchitis 12/24/2024    Angina pectoris     Asthma     Cutaneous abscess of buttock 01/27/2016    Abscess of buttock    Disease due to severe acute respiratory syndrome coronavirus 2 (SARS-CoV-2) 12/24/2024    GERD (gastroesophageal reflux disease)     Hypercholesteremia     Hypertension     Myocardial infarction (Multi)      "Other chest pain 06/09/2020    Atypical chest pain    Other muscle spasm 09/16/2015    Trapezius muscle spasm    Other specified diseases of anus and rectum 01/26/2016    Rectal pain    Sleep apnea       Past Surgical History:   Procedure Laterality Date    CARDIAC CATHETERIZATION  2020    Non-critical CAD    CARDIOVASCULAR STRESS TEST      12/2023    OTHER SURGICAL HISTORY  2024    Drug induced sleep endoscopy    OTHER SURGICAL HISTORY      INSPIRE    WISDOM TOOTH EXTRACTION            Medications:   Current Outpatient Medications   Medication Instructions    amLODIPine (NORVASC) 5 mg, oral, Daily    buPROPion SR (WELLBUTRIN SR) 150 mg, oral, 2 times daily    nitroglycerin (NITROSTAT) 0.4 mg, Every 5 min PRN    omeprazole (PRILOSEC) 40 mg, oral, Daily, Do not crush or chew.    testosterone cypionate (DEPO-TESTOSTERONE) 200 mg, intramuscular, Every 14 days        Allergies:  No Known Allergies     Physical Exam:  Last Recorded Vitals  Temperature 36.9 °C (98.4 °F), height 1.676 m (5' 6\"), weight 90.7 kg (200 lb).  []General appearance: Well-developed, well-nourished in no acute distress, conversant with normal voice quality    Head/face: No erythema or edema or facial tenderness, and normal facial nerve function bilaterally    External ear: Clear external auditory canals with normal pinnae  Tube status: N/A  Middle ear: Tympanic membranes intact and mobile, middle ears normal.  Tympanic membrane perforation: N/A  Mastoid bowl: N/A  Hearing: Normal conversational awareness at normal speech thresholds    Nose visualized using: Anterior rhinoscopy  Nasal dorsum: Nontraumatic midline appearance  Septum: Deviated left  Inferior turbinates: Normal, pink, hypertrophy  Secretions: Dry    Oral cavity and oropharynx: Normal  Teeth: Good condition  Floor of mouth: without lesions  Palate: Normal hard palate, soft palate and uvula  Oropharynx: Clear, no lesions present.  The tongue mobility is 100% normal  Buccal mucosa: Normal " without masses or lesions  Lips: Normal    Nasopharynx: Inadequate mirror exam secondary to gag/anatomy    Neck:  Salivary glands: Normal bilateral parotid and submandibular glands by inspection and palpation.  Non-thyroid masses: No palpable masses or significant lymphadenopathy  Trachea: Midline  Thyroid: No thyromegaly or palpable nodules  Temporomandibular joint: Nontender  Cervical range of motion: Normal    Neurologic exam: Alert and oriented x3, appropriate affect.  Cranial nerves II-XII normal bilaterally  Extraocular movement: Extraocular movement intact, normal gaze alignment        Be was seen today for follow-up.  Diagnoses and all orders for this visit:  Obstructive sleep apnea (Primary)  BMI 30.0-30.9,adult  Deviated nasal septum  Asymmetric sensorineural deafness                 PLAN  He will continue to work with Dr. Barrientos for titration.  I will remain available for awake endoscopy if necessary.  Recheck in 12 months.  Recheck audiogram in 1 year.  Noise protection  Jem Mueller MD

## 2025-02-05 ENCOUNTER — OFFICE VISIT (OUTPATIENT)
Dept: SLEEP MEDICINE | Facility: CLINIC | Age: 40
End: 2025-02-05
Payer: COMMERCIAL

## 2025-02-05 VITALS
OXYGEN SATURATION: 97 % | BODY MASS INDEX: 31.49 KG/M2 | HEART RATE: 101 BPM | DIASTOLIC BLOOD PRESSURE: 92 MMHG | RESPIRATION RATE: 16 BRPM | WEIGHT: 195.1 LBS | SYSTOLIC BLOOD PRESSURE: 135 MMHG

## 2025-02-05 DIAGNOSIS — Z96.82 S/P PLACEMENT OF HYPOGLOSSAL NERVE STIMULATOR: ICD-10-CM

## 2025-02-05 DIAGNOSIS — Z78.9 INTOLERANCE OF CONTINUOUS POSITIVE AIRWAY PRESSURE (CPAP) VENTILATION: ICD-10-CM

## 2025-02-05 DIAGNOSIS — G47.33 OSA (OBSTRUCTIVE SLEEP APNEA): Primary | ICD-10-CM

## 2025-02-05 PROCEDURE — 99214 OFFICE O/P EST MOD 30 MIN: CPT | Mod: 25 | Performed by: INTERNAL MEDICINE

## 2025-02-05 PROCEDURE — 3075F SYST BP GE 130 - 139MM HG: CPT | Performed by: INTERNAL MEDICINE

## 2025-02-05 PROCEDURE — 99214 OFFICE O/P EST MOD 30 MIN: CPT | Performed by: INTERNAL MEDICINE

## 2025-02-05 PROCEDURE — 1036F TOBACCO NON-USER: CPT | Performed by: INTERNAL MEDICINE

## 2025-02-05 PROCEDURE — 3080F DIAST BP >= 90 MM HG: CPT | Performed by: INTERNAL MEDICINE

## 2025-02-05 PROCEDURE — 95977 ALYS CPLX CN NPGT PRGRMG: CPT | Performed by: INTERNAL MEDICINE

## 2025-02-05 ASSESSMENT — SLEEP AND FATIGUE QUESTIONNAIRES
HOW LIKELY ARE YOU TO NOD OFF OR FALL ASLEEP WHEN YOU ARE A PASSENGER IN A CAR FOR AN HOUR WITHOUT A BREAK: MODERATE CHANCE OF DOZING
ESS-CHAD TOTAL SCORE: 16
HOW LIKELY ARE YOU TO NOD OFF OR FALL ASLEEP WHILE SITTING AND READING: HIGH CHANCE OF DOZING
SITING INACTIVE IN A PUBLIC PLACE LIKE A CLASS ROOM OR A MOVIE THEATER: SLIGHT CHANCE OF DOZING
HOW LIKELY ARE YOU TO NOD OFF OR FALL ASLEEP WHILE SITTING AND TALKING TO SOMEONE: SLIGHT CHANCE OF DOZING
HOW LIKELY ARE YOU TO NOD OFF OR FALL ASLEEP WHILE SITTING QUIETLY AFTER LUNCH WITHOUT ALCOHOL: MODERATE CHANCE OF DOZING
HOW LIKELY ARE YOU TO NOD OFF OR FALL ASLEEP WHILE LYING DOWN TO REST IN THE AFTERNOON WHEN CIRCUMSTANCES PERMIT: HIGH CHANCE OF DOZING
HOW LIKELY ARE YOU TO NOD OFF OR FALL ASLEEP IN A CAR, WHILE STOPPED FOR A FEW MINUTES IN TRAFFIC: SLIGHT CHANCE OF DOZING
HOW LIKELY ARE YOU TO NOD OFF OR FALL ASLEEP WHILE WATCHING TV: HIGH CHANCE OF DOZING

## 2025-02-05 ASSESSMENT — PAIN SCALES - GENERAL: PAINLEVEL_OUTOF10: 0-NO PAIN

## 2025-02-05 ASSESSMENT — COLUMBIA-SUICIDE SEVERITY RATING SCALE - C-SSRS
2. HAVE YOU ACTUALLY HAD ANY THOUGHTS OF KILLING YOURSELF?: NO
6. HAVE YOU EVER DONE ANYTHING, STARTED TO DO ANYTHING, OR PREPARED TO DO ANYTHING TO END YOUR LIFE?: NO
1. IN THE PAST MONTH, HAVE YOU WISHED YOU WERE DEAD OR WISHED YOU COULD GO TO SLEEP AND NOT WAKE UP?: NO

## 2025-02-05 NOTE — PROGRESS NOTES
[facility] SLEEP MEDICINE   FOLLOW-UP VISIT NOTE      HISTORY OF PRESENT ILLNESS     Patient ID: Be Ortega is a 39 y.o. male who presents here for follow-up {[facility]:34425}.    Patient is here alone today.  INSPIRE representative *** is present today to assist. To review, patient's medical history is notable for ***.     Interval History  Patient was last seen in ***.   Since last visit, patient has been stepping up the INSPIRE amplitude and currently on level *** (~ incoming amplitude of *** volts) at patient control of *** to *** volts)  Per patient, he does not snore at night when using INSPIRE. Denies any issues with usage and was able to tolerate different amplitude settings. He wakes up rested in the morning and denies daytime sleepiness.    RLS Followup:   ***    SLEEP STUDY HISTORY (personally reviewed raw data such as interpretation report, data sheet, hypnogram, and titration table if available and applicable)  ***    SLEEP-WAKE SCHEDULE  Bedtime:  8:30 PM to 9:30 PM on weekdays, 10-11 PM on weekends  Subjective sleep latency: 5-10 minutes  Number of awakenings:  wakes up every hour due to gasping for air (1-2 times to go to bathroom) without NELY treatment. Sleeps thru the night on INSPIRE     Final wake time:  3:30 AM to 4:30 AM daily  Out of bed time:  4:30 AM daily  Shift work: no  Naps: no  Average sleep duration (excluding naps): 6 hours    SLEEP ENVIRONMENT  Sleep location: bed  Sleep status: sleeps with wife  Preferred sleep position: left side and reclined    SOCIAL HISTORY  Smoking: ***  ETOH: ***  Marijuana: ***  Caffeine: ***  Sleep aids: ***    WEIGHT: {weight:48797}    REVIEW OF SYSTEMS     All other systems have been reviewed and are negative.    ALLERGIES     No Known Allergies    MEDICATIONS     Current Outpatient Medications   Medication Sig Dispense Refill   • buPROPion SR (Wellbutrin SR) 150 mg 12 hr tablet Take 1 tablet (150 mg) by mouth 2 times a day.  "180 tablet 1   • testosterone cypionate (Depo-Testosterone) 100 mg/mL injection Inject 2 mL (200 mg) into the muscle every 14 (fourteen) days. 4 mL 1   • amLODIPine (Norvasc) 5 mg tablet Take 1 tablet (5 mg) by mouth once daily. 30 tablet 11   • nitroglycerin (Nitrostat) 0.4 mg SL tablet Place 1 tablet (0.4 mg) under the tongue every 5 minutes if needed for chest pain (ANGINA). (Patient not taking: Reported on 2/5/2025)     • omeprazole (PriLOSEC) 40 mg DR capsule Take 1 capsule (40 mg) by mouth once daily. Do not crush or chew. 90 capsule 3     No current facility-administered medications for this visit.       Active Problems, Allergy List, Medication List, and PMH/PSH/FH/Social Hx have been reviewed and reconciled in chart. No significant changes unless documented in the pertinent chart section. Updates made when necessary.     PHYSICAL EXAM     VITAL SIGNS: BP (!) 135/92   Pulse 101   Resp 16   Wt 88.5 kg (195 lb 1.6 oz)   SpO2 97%   BMI 31.49 kg/m²     NECK CIRCUMFERENCE: *** inches    Today ESS: 16  Last visit ESS: ***  JAMAAL: ***    Physical Exam  Constitutional: Awake, not in distress  Head: normocephalic, atraumatic  Tongue:  Tongue motion was assessed and deemed appropriate. Tongue protrusion was *** lateralizing left and past the incisors.   Skin: Warm, no rash. All incisions appear to be well healed and patient reports no lingering pain at any incision site.   Neuro: No tremors, moves all extremities  Psych: alert and oriented to time, place, and person    RESULTS/DATA     No results found for: \"IRON\", \"TRANSFERRIN\", \"IRONSAT\", \"TIBC\", \"FERRITIN\"    Bicarbonate   Date Value Ref Range Status   01/02/2025 26 21 - 32 mmol/L Final       PROCEDURE NOTES     Sleep study used for INSPIRE eligibility: diagnostic PSG ***  Date of DISE: *** by  *** (DISE demonstrated ***)  Date of INSPIRE implantation : *** by  ***    Date of initial INSPIRE activation: ***  Date when sensor waveform done: ***  Date of " "INSPIRE titration study done: ***    Usage hours: *** hours per week    INCOMING device settings:  Electrode configuration: {EWINSPIRECONFI}  Sensation threshold (ST): *** volts  Functional threshold (FT): *** volts  Incoming Amplitude: *** volts  Patient control: *** to *** volts  Pulse width: *** microseconds  Rate: *** Hz  Start delay: 30 minutes  Pause delay: 15 minutes  Therapy Duration: 8 hours    Changes made at today's visit:  Electrode configuration: {EWINSPIRECONFI}  Sensation threshold (ST): *** volts  Functional threshold (FT): *** volts  Amplitude: *** volts  Patient control: *** to *** volts  Pulse width: *** microseconds  Rate: *** Hz  Start delay: 30 minutes  Pause delay: 15 minutes  Therapy Duration: 8 hours    ASSESSMENT/PLAN     Be Ortega is a 39 y.o. male who returns in [facility] for the following problems:    OBSTRUCTIVE SLEEP APNEA, *** positional ({EWssbaseline:16475} {EWrespiindices:91632}  BMI ***  CPAP intolerance due to mask and pressure intolerance  S/p placement of hypoglossal nerve stimulator (INSPIRE)  - The INSPIRE device was interrogated today. Patient is currently at incoming amplitude of *** volts (Pt control *** to *** volts).  - Reviewed INSPIRE cloud report. Noted INSPIRE usage of *** hours per week. Patient reports tolerating amplitudes well with improved NELY symptoms.  - Several amplitude settings (including incoming amplitude) were evaluated for acceptable tongue movement and comfort. Both sensation threshold (ST) and functional threshold (FT) were reassessed. See Procedure Notes above.  - The waveform was evaluated in *** and awake position. An adequate rise and fall was observed. Screenshot of waveform was taken. Patient was comfortable with the stimulation testing.  - Changes made today on INSPIRE settings. See Procedure Notes above.  - Instructed patient to use therapy \"all-night, every-night\" and continue " stepping up the therapy's stimulation level by one level (0.1 volt) once per week as tolerated.  - Instructed patient to stop increasing steps (i.e. voltage) if sleep quality is improved with reduction of snoring. Also, instructed patient to stop increasing steps (i.e. voltage) and reduce to one level lower if there is any discomfort then re-try stepping up the level every 1-2 weeks.   - Supportive management: Lose weight. Stay off your back when sleeping. Avoid smoking, alcohol, and sedating medications if applicable. Don't drive when sleepy.     SEASONAL ALLERGIES / ALLERGIC RHINITIS / CHRONIC NASAL CONGESTION / POST-NASAL DRIP  - Start fluticasone nasal spray 2 sprays per nostril once daily 1 hour before bedtime.  - If there is inadequate or lack of improvement on the above intranasal steroid spray, consider adding Azelastine nasal spray, 2 sprays per nostril once daily in the morning.   - Alternatively, may add cetirizine or loratadine 10 mg once daily.   - Educated patient on proper use of intranasal sprays.     HYPERTENSION  - BP stable today.  - BP slightly high today. Denies chest discomfort, shortness of breath, palpitations, headache, blurred vision, dizziness, or neurologic deficit  - Untreated  or inadequately treated sleep apnea can lead to new onset hypertension, resistant hypertension, or refractory hypertension.  - Continue anti-hypertensive medications per PCP.  - Supportive management: low salt DASH diet (less than 2000 mg sodium intake daily), moderate intensity aerobic exercise at least 30 minutes 5 days per week, reduce stress, quit smoking, limit alcohol, lose weight, and monitor BP once daily  - PCP following. Defer management to PCP.    All of patient's questions were answered. He verbalizes understanding and agreement with my assessment and plan.    {EWFOOW-UP:36421}   intensity aerobic exercise at least 30 minutes 5 days per week, reduce stress, quit smoking, limit alcohol, lose weight, and monitor BP once daily  - PCP following. Defer management to PCP.    All of patient's questions were answered. He verbalizes understanding and agreement with my assessment and plan.    Follow-up in 2-3 weeks after sleep study.

## 2025-02-09 DIAGNOSIS — K21.9 GASTROESOPHAGEAL REFLUX DISEASE, UNSPECIFIED WHETHER ESOPHAGITIS PRESENT: ICD-10-CM

## 2025-02-09 DIAGNOSIS — R03.0 ELEVATED BLOOD PRESSURE READING: ICD-10-CM

## 2025-02-10 RX ORDER — AMLODIPINE BESYLATE 5 MG/1
5 TABLET ORAL DAILY
Qty: 90 TABLET | Refills: 3 | Status: SHIPPED | OUTPATIENT
Start: 2025-02-10

## 2025-02-10 RX ORDER — OMEPRAZOLE 40 MG/1
40 CAPSULE, DELAYED RELEASE ORAL DAILY
Qty: 90 CAPSULE | Refills: 3 | Status: SHIPPED | OUTPATIENT
Start: 2025-02-10 | End: 2026-02-10

## 2025-02-14 ENCOUNTER — TELEPHONE (OUTPATIENT)
Dept: PRIMARY CARE | Facility: CLINIC | Age: 40
End: 2025-02-14
Payer: COMMERCIAL

## 2025-02-14 DIAGNOSIS — E29.1 HYPOGONADISM IN MALE: ICD-10-CM

## 2025-02-14 RX ORDER — TESTOSTERONE CYPIONATE 1000 MG/10ML
200 INJECTION, SOLUTION INTRAMUSCULAR
Qty: 4 ML | Refills: 1 | Status: SHIPPED | OUTPATIENT
Start: 2025-02-14 | End: 2025-04-11

## 2025-02-14 NOTE — TELEPHONE ENCOUNTER
PC from [facility]:  are going to change the Testosterone dose from 100 mg/mL to 200 mg/mL due to their supply.  Will change directions accordingly.

## 2025-02-17 ENCOUNTER — OFFICE VISIT (OUTPATIENT)
Dept: CARDIOLOGY | Facility: CLINIC | Age: 40
End: 2025-02-17
Payer: COMMERCIAL

## 2025-02-17 ENCOUNTER — TELEPHONE (OUTPATIENT)
Dept: PRIMARY CARE | Facility: CLINIC | Age: 40
End: 2025-02-17

## 2025-02-17 VITALS
HEART RATE: 97 BPM | HEIGHT: 66 IN | RESPIRATION RATE: 18 BRPM | OXYGEN SATURATION: 98 % | SYSTOLIC BLOOD PRESSURE: 124 MMHG | BODY MASS INDEX: 31.82 KG/M2 | WEIGHT: 198 LBS | DIASTOLIC BLOOD PRESSURE: 92 MMHG

## 2025-02-17 DIAGNOSIS — R07.9 CHEST PAIN, UNSPECIFIED TYPE: ICD-10-CM

## 2025-02-17 DIAGNOSIS — E29.1 HYPOGONADISM IN MALE: ICD-10-CM

## 2025-02-17 DIAGNOSIS — I10 ESSENTIAL HYPERTENSION: Primary | ICD-10-CM

## 2025-02-17 DIAGNOSIS — R94.31 ABNORMAL EKG: ICD-10-CM

## 2025-02-17 DIAGNOSIS — E78.00 HYPERCHOLESTEROLEMIA: ICD-10-CM

## 2025-02-17 LAB
ATRIAL RATE: 74 BPM
P AXIS: 11 DEGREES
P OFFSET: 207 MS
P ONSET: 159 MS
PR INTERVAL: 134 MS
Q ONSET: 226 MS
QRS COUNT: 12 BEATS
QRS DURATION: 84 MS
QT INTERVAL: 372 MS
QTC CALCULATION(BAZETT): 412 MS
QTC FREDERICIA: 399 MS
R AXIS: 82 DEGREES
T AXIS: -17 DEGREES
T OFFSET: 412 MS
VENTRICULAR RATE: 74 BPM

## 2025-02-17 PROCEDURE — 3080F DIAST BP >= 90 MM HG: CPT | Performed by: STUDENT IN AN ORGANIZED HEALTH CARE EDUCATION/TRAINING PROGRAM

## 2025-02-17 PROCEDURE — 3074F SYST BP LT 130 MM HG: CPT | Performed by: STUDENT IN AN ORGANIZED HEALTH CARE EDUCATION/TRAINING PROGRAM

## 2025-02-17 PROCEDURE — 99214 OFFICE O/P EST MOD 30 MIN: CPT | Performed by: STUDENT IN AN ORGANIZED HEALTH CARE EDUCATION/TRAINING PROGRAM

## 2025-02-17 PROCEDURE — 3008F BODY MASS INDEX DOCD: CPT | Performed by: STUDENT IN AN ORGANIZED HEALTH CARE EDUCATION/TRAINING PROGRAM

## 2025-02-17 PROCEDURE — 93005 ELECTROCARDIOGRAM TRACING: CPT | Performed by: STUDENT IN AN ORGANIZED HEALTH CARE EDUCATION/TRAINING PROGRAM

## 2025-02-17 RX ORDER — SYRINGE W-NEEDLE,DISPOSAB,3 ML 25GX1"
1 SYRINGE, EMPTY DISPOSABLE MISCELLANEOUS
Qty: 25 EACH | Refills: 1 | Status: SHIPPED | OUTPATIENT
Start: 2025-02-17

## 2025-02-17 ASSESSMENT — PATIENT HEALTH QUESTIONNAIRE - PHQ9
SUM OF ALL RESPONSES TO PHQ9 QUESTIONS 1 AND 2: 0
1. LITTLE INTEREST OR PLEASURE IN DOING THINGS: NOT AT ALL
2. FEELING DOWN, DEPRESSED OR HOPELESS: NOT AT ALL

## 2025-02-17 NOTE — PROGRESS NOTES
Primary Care Physician: Blas Garcia MD   Date of Visit: 02/17/2025  9:40 AM EST  Type of Visit: follow up       Chief Complaint:  Chief Complaint   Patient presents with    Follow-up        HPI  Be Ortega 39 y.o. male with history of hypertension, NELY with CPAP and GERD, had a LHC in 6/2020 at [facility] for concerns of ACS: No significant obstructive coronary disease, note was made of slow flow in the LAD. He followed with Dr. Cabrera and was started on amlodipine with significant improvement. Coronary calcium score 0, echocardiogram was unremarkable and normal nuc stress.   Here today for follow up   Last seen in clinic 11/2023      BP is well controled  No chest pain  No sob   No le edema  No palpitations  No dizziness or syncope  He is using inspira     10 points ROS negative expect for the above    Social History:  Social History     Socioeconomic History    Marital status:      Spouse name: Not on file    Number of children: Not on file    Years of education: Not on file    Highest education level: Not on file   Occupational History    Not on file   Tobacco Use    Smoking status: Former     Current packs/day: 0.00     Types: Cigarettes     Start date: 2007     Quit date: 2015     Years since quitting: 10.1    Smokeless tobacco: Former     Types: Chew     Quit date: 2019    Tobacco comments:     NICOTINE POUCH CALLED ZYN. TAKES OCCASIONAL   Vaping Use    Vaping status: Never Used   Substance and Sexual Activity    Alcohol use: Never    Drug use: Never    Sexual activity: Not on file   Other Topics Concern    Not on file   Social History Narrative    Not on file     Social Drivers of Health     Financial Resource Strain: Not on file   Food Insecurity: Not on file   Transportation Needs: Not on file   Physical Activity: Not on file   Stress: Not on file   Social Connections: Not on file   Intimate Partner Violence: Not on file   Housing Stability: Not on file        Past Medical  "History:  Past Medical History:   Diagnosis Date    Acute bronchitis 12/24/2024    Angina pectoris     Asthma     Cutaneous abscess of buttock 01/27/2016    Abscess of buttock    Disease due to severe acute respiratory syndrome coronavirus 2 (SARS-CoV-2) 12/24/2024    GERD (gastroesophageal reflux disease)     Hypercholesteremia     Hypertension     Myocardial infarction (Multi)     Other chest pain 06/09/2020    Atypical chest pain    Other muscle spasm 09/16/2015    Trapezius muscle spasm    Other specified diseases of anus and rectum 01/26/2016    Rectal pain    Sleep apnea        Past Surgical History:  Past Surgical History:   Procedure Laterality Date    CARDIAC CATHETERIZATION  2020    Non-critical CAD    CARDIOVASCULAR STRESS TEST      12/2023    OTHER SURGICAL HISTORY  2024    Drug induced sleep endoscopy    OTHER SURGICAL HISTORY      INSPIRE    WISDOM TOOTH EXTRACTION         Family History:  Family History   Problem Relation Name Age of Onset    Hypertension Father      Sleep apnea Father      Coronary artery disease Brother      Breast cancer Maternal Grandmother      Other (mesothelioma) Maternal Grandfather      Pancreatic cancer Paternal Grandfather          Objective:       11/20/2024     1:19 PM 12/18/2024     2:02 PM 12/18/2024     4:33 PM 1/2/2025    10:43 AM 2/3/2025     3:42 PM 2/5/2025    12:57 PM 2/17/2025     9:47 AM   Vitals   Systolic 134  124 118  135 124   Diastolic 86  88 82  92 92   BP Location    Left arm   Right arm   Heart Rate 93  84 88  101 97   Temp  37.1 °C (98.7 °F)  36.4 °C (97.6 °F) 36.9 °C (98.4 °F)     Resp 16  16   16 18   Height  1.676 m (5' 6\")  1.676 m (5' 6\") 1.676 m (5' 6\")  1.676 m (5' 6\")   Weight (lb)  199  201 200 195.1 198   BMI  32.12 kg/m2  32.44 kg/m2 32.28 kg/m2 31.49 kg/m2 31.96 kg/m2   BSA (m2)  2.05 m2  2.06 m2 2.06 m2 2.03 m2 2.04 m2   Visit Report Report Report    Report Report    Report Report Report Report Report      Constitutional:       Appearance: " Healthy appearance. Not in distress.   Neck:      Vascular: No JVR. JVD normal.   Pulmonary:      Effort: Pulmonary effort is normal.      Breath sounds: Normal breath sounds. No wheezing. No rhonchi. No rales.   Chest:      Chest wall: Not tender to palpatation.   Cardiovascular:      Normal rate. Regular rhythm. Normal S1. Normal S2.       Murmurs: There is no murmur.      No gallop.  N No rub.   Pulses:     Intact distal pulses.   Edema:     Peripheral edema absent.   Abdominal:      General: Bowel sounds are normal.      Palpations: Abdomen is soft.      Tenderness: There is no abdominal tenderness.   Musculoskeletal: Normal range of motion.         General: No tenderness.   Skin:     General: Skin is warm and dry.   Neurological:      General: No focal deficit present.      Mental Status: Alert and oriented to person, place and time.     Allergies:  No Known Allergies    Medications:  Current Outpatient Medications   Medication Instructions    amLODIPine (NORVASC) 5 mg, oral, Daily    buPROPion SR (WELLBUTRIN SR) 150 mg, oral, 2 times daily    nitroglycerin (NITROSTAT) 0.4 mg, Every 5 min PRN    omeprazole (PRILOSEC) 40 mg, oral, Daily, Do not crush or chew.    testosterone cypionate (DEPO-TESTOSTERONE) 200 mg, intramuscular, Every 14 days        Labs and Imaging:     Lab Results   Component Value Date    WBC 5.0 01/02/2025    HGB 16.1 01/02/2025    HCT 46.1 01/02/2025     01/02/2025    CHOL 140 12/01/2023    TRIG 92 12/01/2023    HDL 45.5 12/01/2023    ALT 83 (H) 01/02/2025    AST 33 01/02/2025     01/02/2025    K 4.1 01/02/2025     01/02/2025    CREATININE 0.98 01/02/2025    BUN 16 01/02/2025    CO2 26 01/02/2025    TSH 0.70 01/02/2025    INR 1.1 06/22/2020    HGBA1C 5.0 08/09/2024         Echocardiogram:   Echocardiogram     [facility], [address], [place] [id number]  Tel [phone] and Fax [phone]    TRANSTHORACIC ECHOCARDIOGRAM REPORT      Patient  "Name:     MILAGROS DANII Santiago Physician:   [id number] Foreign SWIFT MD  Study Date:       7/28/2020           Referring Physician: Foreign Cabrera MD  MRN/PID:          [id number]            PCP:  Accession/Order#: [id number]        Department Location: [facility]  YOB: 1985           Fellow:  Gender:           M                   Nurse:  Admit Date:                           Sonographer:         Gloria Yolanda \"RDCS,  RCS\"  Admission Status: Outpatient          Additional Staff:  Height:           167.64 cm           CC Report to:  Weight:           86.18 kg            Study Type:          Echocardiogram  BSA:              1.96 m2  Blood Pressure: 122 /86 mmHg    Diagnosis/ICD: R07.89-Other chest pain  Indication:    chest pain  Procedure/CPT: Echo Complete w Full [id number]    Patient History:  Pertinent History: Chest Pain, HTN and Family HX of CAD.    Study Detail: The following Echo studies were performed: 2D, M-Mode, Doppler and  color flow.      PHYSICIAN INTERPRETATION:  Left Ventricle: The left ventricular systolic function is normal, with an estimated ejection fraction of 55-60%. There are no regional wall motion abnormalities. The left ventricular cavity size is normal. Spectral Doppler shows a normal pattern of left ventricular diastolic filling.  Left Atrium: The left atrium is normal in size.  Right Ventricle: The right ventricle is normal in size. There is normal right ventricular global systolic function.  Right Atrium: The right atrium is normal in size.  Aortic Valve: The aortic valve is trileaflet. There is no evidence of aortic valve stenosis.  There is no evidence of aortic valve regurgitation. The peak instantaneous gradient of the aortic valve is 4.5 mmHg. The mean gradient of the aortic valve is 3.0 mmHg.  Mitral Valve: The mitral valve is normal in structure. There is trace mitral valve regurgitation.  Tricuspid Valve: " The tricuspid valve is structurally normal. There is trace tricuspid regurgitation.  Pulmonic Valve: The pulmonic valve is structurally normal. There is trace pulmonic valve regurgitation.  Pericardium: There is no pericardial effusion noted.  Aorta: The aortic root is normal.  Pulmonary Artery: The tricuspid regurgitant velocity is 2.21 m/s, and with an estimated right atrial pressure of 10 mmHg, the estimated pulmonary artery pressure is normal with the RVSP at 29.5 mmHg.  Systemic Veins: The inferior vena cava was not well visualized.  In comparison to the previous echocardiogram(s): There are no prior studies on this patient for comparison purposes.      CONCLUSIONS:  1. The left ventricular systolic function is normal with a 55-60% estimated ejection fraction.  2. There is trace mitral, tricuspid and pulmonic regurgitation.    QUANTITATIVE DATA SUMMARY:  2D MEASUREMENTS:  Normal Ranges:  Ao Root d:     3.00 cm   (2.0-3.7cm)  LAs:           3.50 cm   (2.7-4.0cm)  IVSd:          0.90 cm   (0.6-1.1cm)  LVPWd:         0.90 cm   (0.6-1.1cm)  LVIDd:         5.40 cm   (3.9-5.9cm)  LVIDs:         3.60 cm  LV Mass Index: 92.0 g/m2  LV % FS        33.3 %    LA VOLUME:  Normal Ranges:  LA Area A4C:     15.5 cm2  LA Area A2C:     12.8 cm2  LA Volume Index: 18.0 ml/m2  LA Vol A4C:      42.0 ml  LA Vol A2C:      31.0 ml  LA Vol BP:       37.0 ml    LV SYSTOLIC FUNCTION BY 2D PLANIMETRY (MOD):  Normal Ranges:  EF-A4C View: 56.5 % (>55%)  EF-A2C View: 74.5 %  EF-Biplane:  64.6 %    LV DIASTOLIC FUNCTION:  Normal Ranges:  MV Peak E:        0.59 m/s    (0.7-1.2 m/s)  MV Peak A:        0.57 m/s    (0.42-0.7 m/s)  E/A Ratio:        1.03        (1.0-2.2)  MV lateral e'     0.08 m/s  MV medial e'      0.08 m/s  MV A Dur:         114.00 msec  E/e' Ratio:       7.40        (<8.0)  PulmV Sys Dre:    49.40 cm/s  PulmV Ugarte Dre:   35.50 cm/s  PulmV S/D Dre:    1.40  PulmV A Revs Dre: 73.10 cm/s  PulmV A Revs Dur: 98.00 msec    MITRAL  VALVE:  Normal Ranges:  MV DT: 153 msec (150-240msec)    AORTIC VALVE:  Normal Ranges:  AoV Vmax:      1.06 m/s (<1.7m/s)  AoV Peak P.5 mmHg (<20mmHg)  AoV Mean PG:   3.0 mmHg (1.7-11.5mmHg)  AoV VTI:       22.70 cm (18-25cm)  LVOT Diameter: 2.00 cm  (1.8-2.4cm)    RIGHT VENTRICLE:  RV 1   3.8 cm  RV 2   3.5 cm  RV 3   4.9 cm  TAPSE: 20.0 mm    TRICUSPID VALVE/RVSP:  Normal Ranges:  Peak TR Velocity: 2.21 m/s  RV Syst Pressure: 29.5 mmHg (< 30mmHg)    PULMONIC VALVE:  Normal Ranges:  PV Max Dre: 0.9 m/s  (0.6-0.9m/s)  PV Max PG:  3.1 mmHg    Pulmonary Veins:  PulmV A Revs Dur: 98.00 msec  PulmV A Revs Dre: 73.10 cm/s  PulmV Ugarte Dre:   35.50 cm/s  PulmV S/D Dre:    1.40  PulmV Sys Dre:    49.40 cm/s      [id number] Foreign Cabrera MD  Electronically signed on 2020 at 9:13:18 AM         Final   Stress Testing: No results found for this or any previous visit from the past 1825 days.    Cardiac Catheterization:   ADULT CATH     Narrative  Ordered by an unspecified provider.    Cardiac Scoring:   CT HEART CALCIUM SCORING WO IV CONTRAST 2020    Narrative  MRN: [id number]  Patient Name: MILAGROS SWIFT    STUDY:  CT CARDIAC SCORING;  2020 2:08 pm    INDICATION:  hypercholesterolemia..    COMPARISON:  None.    ACCESSION NUMBER(S):  [id number]    ORDERING CLINICIAN:  NICHOLAS WILSON    TECHNIQUE:  Using prospective ECG gating, CT scan of the coronary arteries was  performed without intravenous contrast. Coronary calcium scoring  was  performed according to the method of Agatston.    FINDINGS:  The score and distribution of calcium in the coronary arteries is as  follows:    LM: 0.  LAD: 0.  LCx: 0.  RCA: 0.    Total: 0.    The visualized segments of the lungs demonstrate mild atelectasis.    The visualized mid/lower ascending thoracic aorta measures 2.6 cm in  diameter.    The heart is normal in size. No pericardial effusion is present.    No gross evidence of mediastinal or hilar lymphadenopathy  "is  identified.    There is hepatic steatosis.    Impression  1. Coronary artery calcium score of 0*.    *Coronary artery calcium scoring may be helpful in predicting the  risk for future coronary heart disease events.  According to the  American College of Cardiology Foundation Clinical Expert Consensus  Task Force, such testing provides important prognostic information in  patients with more than one coronary heart disease risk factor. The  coronary artery calcium score correlates with the annual risk of a  non-fatal myocardial infarction or coronary heart disease death.    Coronary artery score            Annual Risk    0-99                             0.4%  100-399                        1.3%  >400                            2.4%    These three \"breakpoints\" correspond to lower, intermediate and high  risk states for future coronary events.  Such information should be  used, along with appropriate clinical judgment, to make decisions  regarding the intensity of risk factor management strategies to treat  blood lipids and to modify other non-lipid coronary risk factors.    Reference: Dearborn P et al. Circulation.  2007; 115:402-426    AAA : No results found for this or any previous visit from the past 1825 days.    OTHER: No results found for this or any previous visit from the past 1825 days.      The ASCVD Risk score (Grazyna DK, et al., 2019) failed to calculate for the following reasons:    The 2019 ASCVD risk score is only valid for ages 40 to 79    Risk score cannot be calculated because patient has a medical history suggesting prior/existing ASCVD     Assessment/Plan:   1. Chest pain, unspecified type           Be Ortega 39 y.o. male with history of hypertension, NELY with CPAP and GERD, had a LHC in 6/2020 at [facility] for concerns of ACS: No significant obstructive coronary disease, note was made of slow flow in the LAD. He followed with Dr. Cabrera and was started on amlodipine with " significant improvement. Coronary calcium score 0, echocardiogram was unremarkable and normal nuc stress.     EKG with NSR and abnormal T waves (unchanged)  Asymptomatic and active  BP is controlled    Plan  Continue amlodipine   SLN script    Will get lipid panel and call with results     Time Spent: I spent minutes reviewing medical testing, obtaining medical history and counselling and educating on diagnosis and documenting clinical encounter.         ____________________________________________________________  Max Reed MD   of Medicine  Division of Cardiovascular Medicine   [facility] [place]  [facility]

## 2025-02-18 PROBLEM — R94.31 ABNORMAL EKG: Status: ACTIVE | Noted: 2025-02-18

## 2025-03-24 ENCOUNTER — APPOINTMENT (OUTPATIENT)
Dept: RADIOLOGY | Facility: HOSPITAL | Age: 40
End: 2025-03-24
Payer: COMMERCIAL

## 2025-04-19 DIAGNOSIS — E29.1 HYPOGONADISM IN MALE: ICD-10-CM

## 2025-04-19 RX ORDER — TESTOSTERONE CYPIONATE 200 MG/ML
INJECTION, SOLUTION INTRAMUSCULAR
Qty: 2 ML | Refills: 0 | Status: SHIPPED | OUTPATIENT
Start: 2025-04-19

## 2025-04-19 RX ORDER — SYRINGE W-NEEDLE,DISPOSAB,3 ML 25GX1"
1 SYRINGE, EMPTY DISPOSABLE MISCELLANEOUS
Qty: 25 EACH | Refills: 1 | Status: SHIPPED | OUTPATIENT
Start: 2025-04-19

## 2025-05-23 ENCOUNTER — TELEPHONE (OUTPATIENT)
Dept: PRIMARY CARE | Facility: CLINIC | Age: 40
End: 2025-05-23
Payer: COMMERCIAL

## 2025-05-23 DIAGNOSIS — E29.1 HYPOGONADISM IN MALE: ICD-10-CM

## 2025-05-23 NOTE — TELEPHONE ENCOUNTER
Medication Refill    Pharmacy: [ [facility] ]    Medication: [ testosterone cypionate 200 mg/ml ]    Quantity: [ 2 mL ]    LOV: [ 01/02/25 ]    NOV: [ none ]

## 2025-05-24 RX ORDER — TESTOSTERONE CYPIONATE 200 MG/ML
INJECTION, SOLUTION INTRAMUSCULAR
Qty: 2 ML | Refills: 0 | Status: SHIPPED | OUTPATIENT
Start: 2025-05-24

## 2025-06-03 ENCOUNTER — TELEPHONE (OUTPATIENT)
Dept: PRIMARY CARE | Facility: CLINIC | Age: 40
End: 2025-06-03

## 2025-06-03 NOTE — TELEPHONE ENCOUNTER
PT wanted to let staff know that he no longer needs the prior authorization for the testosterone because he is going through good [facility].

## 2025-06-13 ENCOUNTER — TELEPHONE (OUTPATIENT)
Dept: PULMONOLOGY | Facility: CLINIC | Age: 40
End: 2025-06-13

## 2025-06-13 NOTE — TELEPHONE ENCOUNTER
Patient called and left a voice mail that he still has not been able to get scheduled for his Inspire Sleep study.    He is wanting to know what he should do and if he should be seen for an inspire check up in the mean time?    Patient sees lexy but lexy is out of the office, not sure how to move forward on this.    Patient is a  and said it may be easier to call him back on his work cellphone @ [phone]

## 2025-06-25 ENCOUNTER — OFFICE VISIT (OUTPATIENT)
Dept: PRIMARY CARE | Facility: CLINIC | Age: 40
End: 2025-06-25
Payer: COMMERCIAL

## 2025-06-25 VITALS
WEIGHT: 197 LBS | HEART RATE: 101 BPM | HEIGHT: 66 IN | BODY MASS INDEX: 31.66 KG/M2 | OXYGEN SATURATION: 95 % | SYSTOLIC BLOOD PRESSURE: 126 MMHG | DIASTOLIC BLOOD PRESSURE: 82 MMHG

## 2025-06-25 DIAGNOSIS — Z12.11 ENCOUNTER FOR SCREENING FOR MALIGNANT NEOPLASM OF COLON: ICD-10-CM

## 2025-06-25 DIAGNOSIS — Z00.00 WELL ADULT EXAM: Primary | ICD-10-CM

## 2025-06-25 DIAGNOSIS — E29.1 HYPOGONADISM IN MALE: ICD-10-CM

## 2025-06-25 PROCEDURE — 1036F TOBACCO NON-USER: CPT | Performed by: FAMILY MEDICINE

## 2025-06-25 PROCEDURE — 3079F DIAST BP 80-89 MM HG: CPT | Performed by: FAMILY MEDICINE

## 2025-06-25 PROCEDURE — 3008F BODY MASS INDEX DOCD: CPT | Performed by: FAMILY MEDICINE

## 2025-06-25 PROCEDURE — 3074F SYST BP LT 130 MM HG: CPT | Performed by: FAMILY MEDICINE

## 2025-06-25 PROCEDURE — 99214 OFFICE O/P EST MOD 30 MIN: CPT | Performed by: FAMILY MEDICINE

## 2025-06-25 RX ORDER — TESTOSTERONE CYPIONATE 200 MG/ML
200 INJECTION, SOLUTION INTRAMUSCULAR
Qty: 2 ML | Refills: 0 | Status: SHIPPED | OUTPATIENT
Start: 2025-06-25

## 2025-06-25 NOTE — PROGRESS NOTES
Do you have:  Any eye problems:    N  2. Frequent nasal congestion or sneezing:  N  3. Difficulty hearing:  N  4. Ear problems:   N  Are you troubled by:  5. Asthma or wheezing:   N  6. Frequent cough:   Y, actually just clearing throat with nicotine pouches  7. Shortness of breath:N  8. Hemoptysis: N  9. Hx of TB: N  Do you have or have you been told you had:  10. High blood pressure: Y, treated  11. Heart disease: N  12. Heart murmur: N  Do you ever have:  13. Chest pain or pressure with exertion:N  14. Leg pains with walking up hill: N  15. Fast heartbeat or palpitations:N  16. Varicose veins: N  Do you have or are you troubled by:  17. Difficulty swallowing foods or liquids: N  18. Abdominal pains: N  19. Frequent indigestion or heartburn: N  20. Constipation: N  21. Diarrhea or loose stools: Y, noticed in last year once in a while will have bad diarrhea and sometimes some blood on tissue  Has there been a definite change:  22. In weight recently: N  23. In bowel movements: Y, pt thinks he's lactose intolerant now due to rxn after eating ice cream  Have you ever had or been told you have:  24. An ulcer: N  25. Black stools: N  26. Jaundice, hepatitis or liver problems: N  27. Gallstones or gallbladder problems: N  28. Stomach or intestinal problems: N  Have you ever:  29. Vomited blood : N  30. Blood in bowel movements: N  31. Been anemic or been treated for blood problems: N  32. Had sickle cell trait or anemia: N  33. Been refused as a blood donor:   Have you had or do you have:  34. Problems with your kidney, bladder, or prostate: N  35. Loss of control of your urine: N  36. Pain or burning with urination: N  37. Blood in your urine: N  38. Trouble starting flow of urine: N  39. Frequent urination at night: N  Have you ever been treated for or told you had:  40. Venereal disease: N  Do you have:  41. Any skin problems: Y, eczema on back  42. Diabetes: N  43. Thyroid disease: N  Are you troubled by:  44.  "Frequent back pain: N  45. Pain or swelling around joints: N  Have you ever:  46. Broken any bones: N  Are you troubled by:  47. Frequent headaches: N  48. Dizziness: N  49. Had Seizures or convulsions: N  50. Temporarily lost control of your hand or foot : N   51. Had a stroke or been paralyzed : N  52. Temporarily lost your ability to speak: N  53. Fainted or lost consciousness: N  Have you ever had:  54. Hallucinations: N  55. Much trouble with Nervousness: N  56. Do you take medications for your nerves: N  57. Trouble falling asleep or staying asleep: Y  58. Do you feel tired even after a good night sleep: Y  59. Do you often feel down in the dumps or depressed: N  60. Do you often feel like crying without any reason: N  61. Do you think that you may be using alcohol excessively: N  62. Do you use any street drugs : N     Do have any other medical problems that are concerning to you : frequent clearing of throat.  Subjective   Patient ID: Be Ortega is a 39 y.o. male who presents for Annual Exam (Be is here today for yearly CPE.).    HPI     Review of Systems    Objective   /82 (BP Location: Right arm, Patient Position: Sitting)   Pulse 101   Ht 1.676 m (5' 6\")   Wt 89.4 kg (197 lb)   SpO2 95%   BMI 31.80 kg/m²     Physical Exam  HENT:      Head: Normocephalic and atraumatic.      Nose: Nose normal.      Mouth/Throat:      Mouth: Mucous membranes are moist.      Pharynx: No oropharyngeal exudate.   Eyes:      Extraocular Movements: Extraocular movements intact.      Conjunctiva/sclera: Conjunctivae normal.      Pupils: Pupils are equal, round, and reactive to light.   Cardiovascular:      Rate and Rhythm: Normal rate and regular rhythm.   Pulmonary:      Effort: Pulmonary effort is normal.      Breath sounds: Normal breath sounds.   Abdominal:      General: There is no distension.      Palpations: Abdomen is soft.   Musculoskeletal:      Cervical back: Normal range of motion and " neck supple.   Lymphadenopathy:      Cervical: No cervical adenopathy.   Neurological:      General: No focal deficit present.      Mental Status: He is alert.   Psychiatric:         Attention and Perception: Attention normal.         Speech: Speech normal.         Behavior: Behavior is cooperative.   Hyperpigmented macules over upper chest and back consistent with tinea versicolor    Assessment/Plan   Problem List Items Addressed This Visit    None  Visit Diagnoses         Codes      Well adult exam    -  Primary Z00.00          Discussed OTC treatment of tinea versicolor and reassurance given   LM discussed  Keep scheduled appointments for sleep apnea issues  Continue current medications  Recheck 6 months sooner if any issues arise

## 2025-06-26 LAB
ALBUMIN SERPL-MCNC: 5.2 G/DL (ref 3.6–5.1)
ALP SERPL-CCNC: 60 U/L (ref 36–130)
ALT SERPL-CCNC: 70 U/L (ref 9–46)
ANION GAP SERPL CALCULATED.4IONS-SCNC: 11 MMOL/L (CALC) (ref 7–17)
AST SERPL-CCNC: 32 U/L (ref 10–40)
BASOPHILS # BLD AUTO: 32 CELLS/UL (ref 0–200)
BASOPHILS NFR BLD AUTO: 0.6 %
BILIRUB SERPL-MCNC: 1.1 MG/DL (ref 0.2–1.2)
BUN SERPL-MCNC: 16 MG/DL (ref 7–25)
CALCIUM SERPL-MCNC: 9.7 MG/DL (ref 8.6–10.3)
CHLORIDE SERPL-SCNC: 103 MMOL/L (ref 98–110)
CHOLEST SERPL-MCNC: 155 MG/DL
CHOLEST/HDLC SERPL: 3.2 (CALC)
CO2 SERPL-SCNC: 24 MMOL/L (ref 20–32)
CREAT SERPL-MCNC: 1.06 MG/DL (ref 0.6–1.26)
EGFRCR SERPLBLD CKD-EPI 2021: 92 ML/MIN/1.73M2
EOSINOPHIL # BLD AUTO: 233 CELLS/UL (ref 15–500)
EOSINOPHIL NFR BLD AUTO: 4.4 %
ERYTHROCYTE [DISTWIDTH] IN BLOOD BY AUTOMATED COUNT: 13 % (ref 11–15)
EST. AVERAGE GLUCOSE BLD GHB EST-MCNC: 108 MG/DL
EST. AVERAGE GLUCOSE BLD GHB EST-SCNC: 6 MMOL/L
GLUCOSE SERPL-MCNC: 96 MG/DL (ref 65–139)
HBA1C MFR BLD: 5.4 %
HCT VFR BLD AUTO: 49 % (ref 38.5–50)
HDLC SERPL-MCNC: 48 MG/DL
HGB BLD-MCNC: 16.5 G/DL (ref 13.2–17.1)
LDLC SERPL CALC-MCNC: 86 MG/DL (CALC)
LYMPHOCYTES # BLD AUTO: 991 CELLS/UL (ref 850–3900)
LYMPHOCYTES NFR BLD AUTO: 18.7 %
MCH RBC QN AUTO: 29 PG (ref 27–33)
MCHC RBC AUTO-ENTMCNC: 33.7 G/DL (ref 32–36)
MCV RBC AUTO: 86.1 FL (ref 80–100)
MONOCYTES # BLD AUTO: 456 CELLS/UL (ref 200–950)
MONOCYTES NFR BLD AUTO: 8.6 %
NEUTROPHILS # BLD AUTO: 3588 CELLS/UL (ref 1500–7800)
NEUTROPHILS NFR BLD AUTO: 67.7 %
NONHDLC SERPL-MCNC: 107 MG/DL (CALC)
PLATELET # BLD AUTO: 209 THOUSAND/UL (ref 140–400)
PMV BLD REES-ECKER: 10.7 FL (ref 7.5–12.5)
POTASSIUM SERPL-SCNC: 4 MMOL/L (ref 3.5–5.3)
PROT SERPL-MCNC: 7.2 G/DL (ref 6.1–8.1)
RBC # BLD AUTO: 5.69 MILLION/UL (ref 4.2–5.8)
SODIUM SERPL-SCNC: 138 MMOL/L (ref 135–146)
TRIGL SERPL-MCNC: 113 MG/DL
WBC # BLD AUTO: 5.3 THOUSAND/UL (ref 3.8–10.8)

## 2025-06-30 ENCOUNTER — APPOINTMENT (OUTPATIENT)
Dept: PRIMARY CARE | Facility: CLINIC | Age: 40
End: 2025-06-30
Payer: COMMERCIAL

## 2025-06-30 DIAGNOSIS — E29.1 HYPOGONADISM IN MALE: ICD-10-CM

## 2025-06-30 RX ORDER — TESTOSTERONE CYPIONATE 200 MG/ML
200 INJECTION, SOLUTION INTRAMUSCULAR
Qty: 2 ML | Refills: 0 | Status: SHIPPED | OUTPATIENT
Start: 2025-06-30

## 2025-07-02 DIAGNOSIS — K62.5 RECTAL BLEEDING: Primary | ICD-10-CM

## 2025-07-09 ENCOUNTER — APPOINTMENT (OUTPATIENT)
Dept: PRIMARY CARE | Facility: CLINIC | Age: 40
End: 2025-07-09
Payer: COMMERCIAL

## 2025-07-25 ENCOUNTER — CLINICAL SUPPORT (OUTPATIENT)
Dept: SLEEP MEDICINE | Facility: HOSPITAL | Age: 40
End: 2025-07-25
Payer: COMMERCIAL

## 2025-07-25 DIAGNOSIS — Z96.82 S/P PLACEMENT OF HYPOGLOSSAL NERVE STIMULATOR: ICD-10-CM

## 2025-07-25 DIAGNOSIS — G47.33 OSA (OBSTRUCTIVE SLEEP APNEA): ICD-10-CM

## 2025-07-25 DIAGNOSIS — Z78.9 INTOLERANCE OF CONTINUOUS POSITIVE AIRWAY PRESSURE (CPAP) VENTILATION: ICD-10-CM

## 2025-07-26 VITALS
DIASTOLIC BLOOD PRESSURE: 82 MMHG | HEIGHT: 66 IN | SYSTOLIC BLOOD PRESSURE: 126 MMHG | BODY MASS INDEX: 31.68 KG/M2 | WEIGHT: 197.09 LBS

## 2025-07-26 NOTE — PROGRESS NOTES
[facility] TECH NOTE:     Patient: Be Ortega   MRN//AGE: [id number]  1985  39 y.o.   Technologist: Clemencia Skinner   Room: 2   Service Date: 25        Sleep Testing Location: [facility]:     TECHNOLOGIST SLEEP STUDY PROCEDURE NOTE:   This sleep study is being conducted according to the policies and procedures outlined by the AAS accreditation standards.  The sleep study procedure and processes involved during this appointment was explained to the patient/patient’s family, questions were answered. The patient/family verbalized understanding.      The patient is a 39 y.o. year old male scheduled for an INSPIRE titration with montage of: INSPIRE. he arrived for his appointment.      The study that was ultimately completed was an INSPIRE titration with montage of: INSPIRE    The full study Was completed.  Patient questionnaires completed?: yes     Consents signed? yes    Initial Fall Risk Screening:     Be has not fallen in the last 6 months. his did not result in injury. Be does not have a fear of falling. He does not need assistance with sitting, standing, or walking. he does not need assistance walking in his home. he does not need assistance in an unfamiliar setting. The patient is notusing an assistive device.     Brief Study observations: Patient came to  for an INSPIRE titration.     Deviation to order/protocol and reason: no      If PAP, which was preferred mask/pressure/mode: no      Other:None    After the procedure, the patient/family was informed to ensure followup with ordering clinician for testing results.      Technologist: Clemencia Skinner

## 2025-08-14 ENCOUNTER — TELEPHONE (OUTPATIENT)
Dept: PRIMARY CARE | Facility: CLINIC | Age: 40
End: 2025-08-14
Payer: COMMERCIAL

## 2025-08-14 DIAGNOSIS — E29.1 HYPOGONADISM IN MALE: ICD-10-CM

## 2025-08-14 RX ORDER — TESTOSTERONE CYPIONATE 200 MG/ML
200 INJECTION, SOLUTION INTRAMUSCULAR
Qty: 2 ML | Refills: 0 | Status: SHIPPED | OUTPATIENT
Start: 2025-08-14

## 2025-08-30 DIAGNOSIS — R53.83 FATIGUE, UNSPECIFIED TYPE: ICD-10-CM

## 2025-09-02 DIAGNOSIS — R53.83 FATIGUE, UNSPECIFIED TYPE: Primary | ICD-10-CM

## 2025-09-02 RX ORDER — BUPROPION HYDROCHLORIDE 300 MG/1
300 TABLET ORAL EVERY MORNING
Qty: 90 TABLET | Refills: 1 | Status: SHIPPED | OUTPATIENT
Start: 2025-09-02 | End: 2026-03-01

## 2025-09-02 RX ORDER — BUPROPION HYDROCHLORIDE 150 MG/1
150 TABLET, EXTENDED RELEASE ORAL 2 TIMES DAILY
Qty: 180 TABLET | Refills: 0 | Status: SHIPPED | OUTPATIENT
Start: 2025-09-02 | End: 2025-09-02 | Stop reason: ALTCHOICE

## (undated) DEVICE — SOLUTION, IRRIGATION, X RX SODIUM CHL 0.9%, 1000ML BTL

## (undated) DEVICE — SYRINGE, 10 CC, LUER LOCK

## (undated) DEVICE — CATHETER, IV PROTECTIVE, 20 G X 1.25 IN

## (undated) DEVICE — DRAPE, INCISE, ANTIMICROBIAL, IOBAN 2, 13 X 13 IN, DISPOSABLE, STERILE

## (undated) DEVICE — SUTURE, VICRYL, 3-0, 27 IN, SH

## (undated) DEVICE — ASCOPE 4, RHINOLARYNGO SLIM, SINGLE USE, STERILE

## (undated) DEVICE — Device

## (undated) DEVICE — SUTURE, SILK, 2-0, 30 IN, RB-1, BLACK

## (undated) DEVICE — DRAPE, U-DRAPE, NON STERILE

## (undated) DEVICE — COVER, EQUIPMENT, FLUOROSCAN, W/2 ADHESIVE STRIPS, STERI DRAPE, 35 X 43 IN, DISPOSABLE, STERILE

## (undated) DEVICE — ELECTRODE, TRI NEEDLE, GREEN 15X1000MM

## (undated) DEVICE — ELECTRODE, PAIRED SUBDERMAL, BLUE, 18X2.5MM

## (undated) DEVICE — NEEDLE, ELECTRODE, ELECTROSURGICAL, INSULATED

## (undated) DEVICE — LUBRICANT, SURGILUBE, STERILE, 2OZ

## (undated) DEVICE — SUTURE, SILK, 2-0, 30 IN, SH, BLACK

## (undated) DEVICE — GLOVE, SURGICAL, PROTEXIS PI , 7.5, PF, LF

## (undated) DEVICE — PROBE, BIOPOLAR MICRO FORK, 45MM, STRAIGHT

## (undated) DEVICE — GOWN, SURGICAL, SMARTGOWN, XLARGE, STERILE

## (undated) DEVICE — SLEEVE, VASO PRESS, CALF GARMENT, MEDIUM, GREEN

## (undated) DEVICE — PREP TRAY, W/SOLUTION, GLOVES, APPLICATORS, POVIDONE IODINE

## (undated) DEVICE — SPONGE, DISSECTING, PEANUT, 3/8 IN, XRAY DETECTABLE

## (undated) DEVICE — VESSEL LOOP, WHITE MINI, 2 CARD

## (undated) DEVICE — SLEEP REMOTE, INSPIRE, MODEL 2580

## (undated) DEVICE — 36CM CODMAN MALLEABLE CATHETER PASSER, STERILE, DISPOSABLE

## (undated) DEVICE — PREP TRAY, VAGINAL, SKIN SCRUB, PVP-1 PAINT & 110ML PV, LF

## (undated) DEVICE — STRIP, SKIN CLOSURE, STERI STRIP, REINFORCED, 0.5 X 4 IN

## (undated) DEVICE — SUTURE, MONOCRYL, 4-0, 27 IN, PS-2, UNDYED

## (undated) DEVICE — MARKER, SURGICAL, SKIN, REG TIP, W/ RULER & LABELS

## (undated) DEVICE — DRESSING, MOISTURE VAPOR PERMEABLE, TEGADERM, 2 3/8 X 2 3/4IN, TRANSPARENT

## (undated) DEVICE — ELECTRODE, PAIRED SUBDERMAL, RED, 18X2.5MM

## (undated) DEVICE — ADHESIVE, SKIN, MASTISOL, 2/3 CC VIAL